# Patient Record
Sex: FEMALE | Race: WHITE | NOT HISPANIC OR LATINO | URBAN - METROPOLITAN AREA
[De-identification: names, ages, dates, MRNs, and addresses within clinical notes are randomized per-mention and may not be internally consistent; named-entity substitution may affect disease eponyms.]

---

## 2019-12-29 ENCOUNTER — EMERGENCY (EMERGENCY)
Facility: HOSPITAL | Age: 59
LOS: 1 days | Discharge: HOME | End: 2019-12-29
Attending: EMERGENCY MEDICINE
Payer: COMMERCIAL

## 2019-12-29 VITALS
DIASTOLIC BLOOD PRESSURE: 93 MMHG | HEIGHT: 61 IN | HEART RATE: 93 BPM | WEIGHT: 136.03 LBS | OXYGEN SATURATION: 99 % | TEMPERATURE: 98 F | RESPIRATION RATE: 20 BRPM | SYSTOLIC BLOOD PRESSURE: 148 MMHG

## 2019-12-29 VITALS
RESPIRATION RATE: 18 BRPM | SYSTOLIC BLOOD PRESSURE: 138 MMHG | OXYGEN SATURATION: 98 % | HEART RATE: 72 BPM | DIASTOLIC BLOOD PRESSURE: 83 MMHG

## 2019-12-29 DIAGNOSIS — Z98.890 OTHER SPECIFIED POSTPROCEDURAL STATES: Chronic | ICD-10-CM

## 2019-12-29 PROCEDURE — 73700 CT LOWER EXTREMITY W/O DYE: CPT

## 2019-12-29 PROCEDURE — 73562 X-RAY EXAM OF KNEE 3: CPT

## 2019-12-29 PROCEDURE — 72192 CT PELVIS W/O DYE: CPT | Mod: 26

## 2019-12-29 PROCEDURE — 99284 EMERGENCY DEPT VISIT MOD MDM: CPT | Mod: 25

## 2019-12-29 PROCEDURE — 73700 CT LOWER EXTREMITY W/O DYE: CPT | Mod: 26,LT

## 2019-12-29 PROCEDURE — 76377 3D RENDER W/INTRP POSTPROCES: CPT

## 2019-12-29 PROCEDURE — 73562 X-RAY EXAM OF KNEE 3: CPT | Mod: 26,LT

## 2019-12-29 PROCEDURE — 72192 CT PELVIS W/O DYE: CPT

## 2019-12-29 PROCEDURE — 99284 EMERGENCY DEPT VISIT MOD MDM: CPT

## 2019-12-29 RX ORDER — ACETAMINOPHEN 500 MG
975 TABLET ORAL ONCE
Refills: 0 | Status: COMPLETED | OUTPATIENT
Start: 2019-12-29 | End: 2019-12-29

## 2019-12-29 RX ORDER — OXYCODONE HYDROCHLORIDE 5 MG/1
10 TABLET ORAL ONCE
Refills: 0 | Status: DISCONTINUED | OUTPATIENT
Start: 2019-12-29 | End: 2019-12-29

## 2019-12-29 RX ORDER — IBUPROFEN 200 MG
600 TABLET ORAL ONCE
Refills: 0 | Status: COMPLETED | OUTPATIENT
Start: 2019-12-29 | End: 2019-12-29

## 2019-12-29 RX ORDER — ONDANSETRON 8 MG/1
4 TABLET, FILM COATED ORAL ONCE
Refills: 0 | Status: COMPLETED | OUTPATIENT
Start: 2019-12-29 | End: 2019-12-29

## 2019-12-29 RX ORDER — LIDOCAINE 4 G/100G
2 CREAM TOPICAL ONCE
Refills: 0 | Status: COMPLETED | OUTPATIENT
Start: 2019-12-29 | End: 2019-12-29

## 2019-12-29 RX ADMIN — ONDANSETRON 4 MILLIGRAM(S): 8 TABLET, FILM COATED ORAL at 15:52

## 2019-12-29 RX ADMIN — Medication 600 MILLIGRAM(S): at 14:13

## 2019-12-29 RX ADMIN — LIDOCAINE 2 PATCH: 4 CREAM TOPICAL at 14:14

## 2019-12-29 RX ADMIN — OXYCODONE HYDROCHLORIDE 10 MILLIGRAM(S): 5 TABLET ORAL at 14:13

## 2019-12-29 RX ADMIN — Medication 975 MILLIGRAM(S): at 14:12

## 2019-12-29 NOTE — ED PROVIDER NOTE - NSFOLLOWUPINSTRUCTIONS_ED_ALL_ED_FT
Use crutches all the time. Do not put weight on left hip. Take ibuprofen 600 mg (3 tabs) every 8 hours with food as needed for pain. Take acetaminophen 500 mg (1 pill extra strength tylenol) every 6 hours as needed for pain. Follow-up with orthopedic surgeon for hip pain, consideration for replacement.

## 2019-12-29 NOTE — ED PROVIDER NOTE - NSFOLLOWUPCLINICS_GEN_ALL_ED_FT
[facility]  Orthopedics  .  [place]   Phone: [phone]  Fax:   Follow Up Time: 1-3 Days

## 2019-12-29 NOTE — ED PROVIDER NOTE - CLINICAL SUMMARY MEDICAL DECISION MAKING FREE TEXT BOX
Dr. Casas (Attending Physician)  Pt. with ho osteochondritis desiccans pw left hip pain and left low back pain radiating down her left leg worse with movement. DP pulses 2+, NV intact distally, TTP over left low back and left trochanter of hip.  Since unable to weight bare will CT hip since had xrays done previously at [place] 2 days ago.

## 2019-12-29 NOTE — ED PROVIDER NOTE - PATIENT PORTAL LINK FT
You can access the FollowMyHealth Patient Portal offered by [facility] by registering at the following website: [email or web address]. By joining [facility]’s FollowMyHealth portal, you will also be able to view your health information using other applications (apps) compatible with our system.

## 2019-12-29 NOTE — ED ADULT NURSE NOTE - OBJECTIVE STATEMENT
Pt presents for eval of left sided back pain, radiating down left leg to knee, for which she was seen @ [facility] and sent with Motrin, which she states did not help.  She cannot bear weight on left leg.  Denies direct trauma, stating congenital hip dysplasia as a hx.  No redness, swelling or other deformity noted.

## 2019-12-29 NOTE — ED PROVIDER NOTE - PROGRESS NOTE DETAILS
Dr. Casas (Attending Physician)  No fracture. Bilateral hip arthritis. Will give crutches and follow-up with ortho.

## 2019-12-29 NOTE — ED PROVIDER NOTE - OBJECTIVE STATEMENT
Dr. Casas (Attending Physician)  Pt. with ho osteochondrosis pw left low back pain x 4 days followed by left hip pain radiating down to left knee worse with movement, constant cramping. Denies numbness, tingling, weakness, no urinary or bowel incontinence, no perineal paresthesias. Unable to bare weight on left leg. Seen at [place] 2 days ago had hip xray, bilateral LE ultrasound.

## 2019-12-29 NOTE — ED PROVIDER NOTE - CARE PROVIDER_API CALL
Adonis Carballo)  Orthopedics  [address], [address]  [place] [address]  Phone: [phone]  Fax: [phone]  Follow Up Time: 1-3 Days

## 2019-12-30 ENCOUNTER — INBOUND DOCUMENT (OUTPATIENT)
Age: 59
End: 2019-12-30

## 2019-12-31 PROBLEM — M48.00 SPINAL STENOSIS, SITE UNSPECIFIED: Chronic | Status: ACTIVE | Noted: 2019-12-29

## 2019-12-31 PROBLEM — F07.81 POSTCONCUSSIONAL SYNDROME: Chronic | Status: ACTIVE | Noted: 2019-12-29

## 2019-12-31 PROBLEM — Q65.89 OTHER SPECIFIED CONGENITAL DEFORMITIES OF HIP: Chronic | Status: ACTIVE | Noted: 2019-12-29

## 2020-01-07 ENCOUNTER — APPOINTMENT (OUTPATIENT)
Dept: ORTHOPEDIC SURGERY | Facility: CLINIC | Age: 60
End: 2020-01-07
Payer: COMMERCIAL

## 2020-01-07 VITALS
HEART RATE: 78 BPM | WEIGHT: 135 LBS | BODY MASS INDEX: 25.49 KG/M2 | SYSTOLIC BLOOD PRESSURE: 158 MMHG | HEIGHT: 61 IN | DIASTOLIC BLOOD PRESSURE: 97 MMHG

## 2020-01-07 PROCEDURE — 73523 X-RAY EXAM HIPS BI 5/> VIEWS: CPT

## 2020-01-07 PROCEDURE — 99205 OFFICE O/P NEW HI 60 MIN: CPT

## 2020-01-07 NOTE — PHYSICAL EXAM
[de-identified] : Patient is well nourished, well-developed, in no acute distress, with appropriate mood and affect. The patient is oriented to time, place, and person. Respirations are even and unlabored. Gait evaluation does not reveal a limp. There is no inguinal adenopathy. \par The right limb is well-perfused and showed 2+ dp/pt pulses, without skin lesions, shows a grossly normal motor and sensory examination. Examination of the hip shows no skin lesions. Hip motion is full and painless from 0-90 degrees extension to flexion, 10 degrees adduction and 10 degrees abduction, and 0 degrees internal and 10 degrees external rotation. Stinchfield test is positive. FADIR test is positive. DEANGELO test is positive. The left limb is well-perfused and showed 2+ dp/pt pulses, without skin lesions, shows a grossly normal motor and sensory examination. Examination of the hip shows no skin lesions. Hip motion is full and painless from 0-90 degrees extension to flexion, 20 degrees adduction and 20 degrees abduction, and 15 degrees internal and 30 degrees external rotation. Stinchfield test is positive.  FADIR test is positive. DEANGELO test is positive. Leg lengths are approximately equal. Both hips are stable and muscle strength is normal with good strength with resisted abduction and adduction. Pedal pulses are palpable.  Tender to palpation about the sacroiliac joint.\par Examination of the lumbar spine reveals full range of motion without pain. There is no tenderness to palpation of the osseous structures or paravertebral soft tissues. There is no muscle spasm. Straight leg raise is negative bilaterally. [de-identified] : AP and lateral x-rays of the bilateral hip, pelvis, and femur were ordered and taken in the office and demonstrate moderate to severe degenerative joint disease of the hip with joint space narrowing, osteophyte formation, and subchondral sclerosis.\par

## 2020-01-07 NOTE — HISTORY OF PRESENT ILLNESS
[de-identified] : This is a 59-year-old female experiencing bilateral hip pain, which is severe in intensity.  She also has severe bilateral buttock pain and low back pain.  No bowel or bladder incontinence.  Her pain was severe that she went to the emergency department recently and received multiple different narcotics and NSAIDs which did not help to improve the pain.  She is pending a left SI joint injection and the left hip joint injection soon.  She is previously had bilateral hip injections which did not help to improve the pain.  The pain substantially limits activities of daily living. Walking tolerance is reduced. Medication and activity modification have been minimally effective for a period lasting greater than three months in duration. Assistive devices and external support were not deemed by the patient to be helpful in improving their function. Due to the severity of osteoarthritis and level of pain, physical therapy is contraindicated. Pain and restriction of function are intolerable at this time.  She is using crutches now.

## 2020-01-07 NOTE — DISCUSSION/SUMMARY
[de-identified] : This patient has bilateral hip osteoarthritis.  However she is coexisting sacroiliitis and lumbar spinal stenosis.  It is unclear the exact etiology and source of her extreme pain that she has given the severity of her pain symptoms.  As she is pending in 2 days sacroiliac joint injections I suggest that she do this first and keep a pain diary.  She is also referred to physiatry consult consider epidural steroid injections or selective nerve root injections to see if this will help improve her pain.  I would like her to follow-up with me in 2 to 3 months after receiving all the injections including another round of the hip joint injections to see what relieves the most pain.  If it is the hip joint that it relieves the most pain and she would be a good candidate for total hip arthroplasty at this time.

## 2020-04-21 ENCOUNTER — APPOINTMENT (OUTPATIENT)
Dept: ORTHOPEDIC SURGERY | Facility: CLINIC | Age: 60
End: 2020-04-21

## 2020-10-06 ENCOUNTER — APPOINTMENT (OUTPATIENT)
Dept: ORTHOPEDIC SURGERY | Facility: CLINIC | Age: 60
End: 2020-10-06
Payer: COMMERCIAL

## 2020-10-06 PROCEDURE — 73522 X-RAY EXAM HIPS BI 3-4 VIEWS: CPT

## 2020-10-06 PROCEDURE — 99215 OFFICE O/P EST HI 40 MIN: CPT

## 2020-10-06 NOTE — DISCUSSION/SUMMARY
[de-identified] : This patient has bilateral hip osteoarthritis.  She is failed a course of conservative management and would like to proceed with a direct anterior approach right total hip arthroplasty.  After allowing a minimum of 3 months for healing she would then like to proceed with a staged left total hip arthroplasty.\par \par The patient is an appropriate candidate for consideration of right total hip replacement. An extensive discussion was conducted of the natural history of the disease and the variety of surgical and non-surgical treatment options available to the patient. A risk/benefit analysis was discussed with the patient reviewing the advantages and disadvantages of surgical intervention at this time. Both the level and length of the patient's pain have made additional conservative treatment measures consisting of NSAIDs, physical therapy, and/or corticosteroids contraindicated. A full explanation was given of the nature and the purpose of the procedure and anesthesia, its benefits, possible alternative methods of diagnosis or treatment, the risks involved, the possibility of complications, the foreseeable consequences of the procedure and the possible results of the non-treatment. I reviewed the plan of care as well as used a model of a total hip implant equivalent to the one that will be used for their total hip joint replacement. The ability to secure the implant utilizing cement or cementless (press-fit) was discussed with the patient. The patient agrees with the plan of care, as well as the use of implants for their total hip replacement. \par \par No guarantee or assurance was made as to the results that may be obtained. Specifically, the risks were identified to include, but are not limited to the following: Infection, phlebitis, pulmonary embolism, death, paralysis, dislocation, pain, stiffness, instability, limp, weakness, breakage, leg-length inequality, uncontrolled bleeding, nerve injury, blood vessel injury, pressure sores, anesthetic risks, delayed healing of wound and bone, and wear and loosening. Further discussion was undertaken with the patient about the details of surgical preparation, treatment, and postoperative rehabilitation including medical clearance, autotransfusion, the hospital course, and the postoperative rehabilitation involved. All in all, I feel that this patient is a good candidate for surgical reconstruction.\par \par The patient and I discussed the current SARS-CoV-2 (COVID-19) pandemic which has affected our local hospitals. We discussed that our hospitals treat patients with COVID-19. All efforts will be made to avoid cohorting the patient with diagnosed or suspected COVID-19 patient. They also understand that we will screen them 24-48 hours prior to surgery. Despite our best efforts, there is a potential risk for iatrogenic transmission of COVID-19 to the patient during the perioperative period. Juan David COVID-19 during the perioperative period may increase the patient´s risks of an adverse outcome including postoperative pneumonia, difficulty breathing, requirement for a breathing tube (general endotracheal intubation), and death. The patient is understanding of this risk, and is willing to proceed with surgery at this time.\par \par I advised the patient that she is to avoid her elderly mother who has an open wound at home for least the first 1 to 2 weeks after surgery as this could increase her risk of infection after surgery.\par \par This patient understands that the implants that will be used may wear out or loosen over time resulting in failure of the device. Given the patient's relatively young age, this may require one or multiple revision operations in their lifetime. The patient understands this and is willing to proceed with surgery at this time. \par \par The patient also understands that the surgery will be performed in a staged manner.  We will do the right hip first which may cause the legs to be slightly unequal with the right leg being slightly longer than the left leg temporarily.  After allowing 3 months for healing then we will proceed with a left total hip arthroplasty at which point we will equalize her leg lengths.\par \par The patient and I discussed the option for outpatient joint replacement. They will not stay overnight in the hospital after surgery and will discharge home on the same day of surgery. The risks and benefits of this type of rapid recovery protocol was reviewed with the patient and they are in agreement with proceeding with outpatient joint replacement surgery. They understand that in the event of an emergency, that they will be transferred to the main hospital for inpatient care.

## 2020-10-06 NOTE — HISTORY OF PRESENT ILLNESS
[de-identified] : This is a 60-year-old female experiencing bilateral hip pain, which is severe in intensity.  She also has severe bilateral buttock pain and low back pain.  No bowel or bladder incontinence.  She has known right hip osteoarthritis and known left hip osteoarthritis.  She has been trialing SI joint injections which helped approximately 50% on the left side but did not help on the right side and helped approximately 50% with left hip trochanteric bursa injection on the left side but did not help on the right side.  Mainly the pain is in the legs in the groin and thigh that radiates down to the knee.  She is previously had bilateral hip injections which did not help to improve the pain.  The pain substantially limits activities of daily living. Walking tolerance is reduced. Medication and activity modification have been minimally effective for a period lasting greater than three months in duration. Assistive devices and external support were not deemed by the patient to be helpful in improving their function. Due to the severity of osteoarthritis and level of pain, physical therapy is contraindicated. Pain and restriction of function are intolerable at this time.  Ambulating without a cane today but has used a crutch in the past.

## 2020-10-06 NOTE — PHYSICAL EXAM
[de-identified] : Patient is well nourished, well-developed, in no acute distress, with appropriate mood and affect. The patient is oriented to time, place, and person. Respirations are even and unlabored. Gait evaluation reveals a limp. There is no inguinal adenopathy.  The right limb is well-perfused and showed 2+ dp/pt pulses, without skin lesions, shows a grossly normal motor and sensory examination. Examination of the hip shows no skin lesions. Hip motion is reduced and causes pain. FADIR is positive and DEANGELO is positive. Stinchfield test is positive. The left limb is well-perfused and showed 2+ dp/pt pulses, without skin lesions, shows a grossly normal motor and sensory examination. Examination of the hip shows no skin lesions. Hip motion is reduced and causes pain. FADIR is positive and DEANGELO is positive. Stinchfield test is positive.  Leg lengths are approximately equal. Both hips are stable and muscle strength is normal with good strength with resisted abduction and adduction. Pedal pulses are palpable. [de-identified] : AP and lateral x-rays of the bilateral hip, pelvis, and femur were ordered and taken in the office and demonstrate severe degenerative joint disease of the hip with joint space narrowing, osteophyte formation, and subchondral sclerosis.\par

## 2020-10-29 ENCOUNTER — OUTPATIENT (OUTPATIENT)
Dept: OUTPATIENT SERVICES | Facility: HOSPITAL | Age: 60
LOS: 1 days | Discharge: HOME | End: 2020-10-29
Payer: COMMERCIAL

## 2020-10-29 VITALS
RESPIRATION RATE: 16 BRPM | TEMPERATURE: 99 F | OXYGEN SATURATION: 98 % | DIASTOLIC BLOOD PRESSURE: 77 MMHG | HEIGHT: 61 IN | SYSTOLIC BLOOD PRESSURE: 110 MMHG | WEIGHT: 139.55 LBS | HEART RATE: 83 BPM

## 2020-10-29 DIAGNOSIS — M16.11 UNILATERAL PRIMARY OSTEOARTHRITIS, RIGHT HIP: ICD-10-CM

## 2020-10-29 DIAGNOSIS — Z98.890 OTHER SPECIFIED POSTPROCEDURAL STATES: Chronic | ICD-10-CM

## 2020-10-29 DIAGNOSIS — Z01.818 ENCOUNTER FOR OTHER PREPROCEDURAL EXAMINATION: ICD-10-CM

## 2020-10-29 DIAGNOSIS — E03.9 HYPOTHYROIDISM, UNSPECIFIED: ICD-10-CM

## 2020-10-29 DIAGNOSIS — E78.5 HYPERLIPIDEMIA, UNSPECIFIED: ICD-10-CM

## 2020-10-29 LAB
ANION GAP SERPL CALC-SCNC: 5 MMOL/L — SIGNIFICANT CHANGE UP (ref 5–17)
APTT BLD: 34 SEC — SIGNIFICANT CHANGE UP (ref 27.5–35.5)
BLD GP AB SCN SERPL QL: SIGNIFICANT CHANGE UP
BUN SERPL-MCNC: 12 MG/DL — SIGNIFICANT CHANGE UP (ref 7–23)
CALCIUM SERPL-MCNC: 8.9 MG/DL — SIGNIFICANT CHANGE UP (ref 8.5–10.1)
CHLORIDE SERPL-SCNC: 107 MMOL/L — SIGNIFICANT CHANGE UP (ref 96–108)
CO2 SERPL-SCNC: 29 MMOL/L — SIGNIFICANT CHANGE UP (ref 22–31)
CREAT SERPL-MCNC: 0.65 MG/DL — SIGNIFICANT CHANGE UP (ref 0.5–1.3)
GLUCOSE SERPL-MCNC: 82 MG/DL — SIGNIFICANT CHANGE UP (ref 70–99)
HCT VFR BLD CALC: 40.3 % — SIGNIFICANT CHANGE UP (ref 34.5–45)
HGB BLD-MCNC: 13 G/DL — SIGNIFICANT CHANGE UP (ref 11.5–15.5)
INR BLD: 1.05 RATIO — SIGNIFICANT CHANGE UP (ref 0.88–1.16)
MCHC RBC-ENTMCNC: 29 PG — SIGNIFICANT CHANGE UP (ref 27–34)
MCHC RBC-ENTMCNC: 32.3 GM/DL — SIGNIFICANT CHANGE UP (ref 32–36)
MCV RBC AUTO: 90 FL — SIGNIFICANT CHANGE UP (ref 80–100)
MRSA PCR RESULT.: SIGNIFICANT CHANGE UP
NRBC # BLD: 0 /100 WBCS — SIGNIFICANT CHANGE UP (ref 0–0)
PLATELET # BLD AUTO: 284 K/UL — SIGNIFICANT CHANGE UP (ref 150–400)
POTASSIUM SERPL-MCNC: 3.9 MMOL/L — SIGNIFICANT CHANGE UP (ref 3.5–5.3)
POTASSIUM SERPL-SCNC: 3.9 MMOL/L — SIGNIFICANT CHANGE UP (ref 3.5–5.3)
PROTHROM AB SERPL-ACNC: 12.2 SEC — SIGNIFICANT CHANGE UP (ref 10.6–13.6)
RBC # BLD: 4.48 M/UL — SIGNIFICANT CHANGE UP (ref 3.8–5.2)
RBC # FLD: 12.8 % — SIGNIFICANT CHANGE UP (ref 10.3–14.5)
S AUREUS DNA NOSE QL NAA+PROBE: SIGNIFICANT CHANGE UP
SODIUM SERPL-SCNC: 141 MMOL/L — SIGNIFICANT CHANGE UP (ref 135–145)
WBC # BLD: 6.81 K/UL — SIGNIFICANT CHANGE UP (ref 3.8–10.5)
WBC # FLD AUTO: 6.81 K/UL — SIGNIFICANT CHANGE UP (ref 3.8–10.5)

## 2020-10-29 PROCEDURE — 93010 ELECTROCARDIOGRAM REPORT: CPT

## 2020-10-29 RX ORDER — ATORVASTATIN CALCIUM 80 MG/1
25 TABLET, FILM COATED ORAL
Qty: 0 | Refills: 0 | DISCHARGE

## 2020-10-29 RX ORDER — ATORVASTATIN CALCIUM 80 MG/1
0 TABLET, FILM COATED ORAL
Qty: 0 | Refills: 0 | DISCHARGE

## 2020-10-29 RX ORDER — LEVOTHYROXINE SODIUM 125 MCG
0 TABLET ORAL
Qty: 0 | Refills: 0 | DISCHARGE

## 2020-10-29 NOTE — OCCUPATIONAL THERAPY INITIAL EVALUATION ADULT - PERTINENT HX OF CURRENT PROBLEM, REHAB EVAL
R hip OA which impacts patients ability to perform functional tasks and transfers/mobility. Patient will have R MIGUEL anterior approach done on 11/13/20.

## 2020-10-29 NOTE — OCCUPATIONAL THERAPY INITIAL EVALUATION ADULT - ADDITIONAL COMMENTS
Patient lives with mother (who can not assist after. Patients brother can assist post op) in a private house with two steps with bilateral rails that are far apart. Once inside, the patients main bedroom and bathroom is on that level when entering. The patients bathroom has a tub/shower combination, fixed shower head, regular toilet and no grab bars. The patient ambulates with no device and does not own any device for ambulation. The patient daily pain is a 8/10 at rest and a 10/10 with movement. The patient manages the pain with rest. The patient has no recent outpatient PT, no recent falls and no buckling reported. Patient wears glasses all the time, R handed, drives and has no hearing impairments.

## 2020-10-29 NOTE — H&P PST ADULT - HISTORY OF PRESENT ILLNESS
60F pmh hypothyroid, hld c/o right hip pain 2/2 unilateral primary osteoarthritis here for PST for scheduled Right total hip arthroplasty direct anterior approach  This patient denies any fever, cough, sob, flu like symptoms or travel outside of the [place] in the past 30 days

## 2020-10-29 NOTE — H&P PST ADULT - NSICDXPROBLEM_GEN_ALL_CORE_FT
PROBLEM DIAGNOSES  Problem: Unilateral primary osteoarthritis, right hip  Assessment and Plan: Right total hip arthroplasty direct anterior approach  labs - cbc,pt/ptt,bmp,t&s,nose cx,ekg  M/C required  preop 3 day hibiclens instruction reviewed and given .instructed on if  nose cx positive use mupuricin 5 days and checklist given  take routine meds DOS with sips of water. avoid NSAID and OTC supplements. verbalized understanding  information on proper nutrition , increase protein and better food choices provided in packet   Ensure clear given    Problem: Hypothyroid  Assessment and Plan: Continue current regimen and medications.     Problem: HLD (hyperlipidemia)  Assessment and Plan: Continue current regimen and medications.

## 2020-10-29 NOTE — PHYSICAL THERAPY INITIAL EVALUATION ADULT - MODIFIED CLINICAL TEST OF SENSORY INTEGRATION IN BALANCE TEST
30 second Sit to Stand Test: (reps: 5 adaptation: B hands used) Functional assessment of lower extremity strength and ability to maintain balance in standing, discriminates those with low and high levels of functional activity.

## 2020-10-29 NOTE — PHYSICAL THERAPY INITIAL EVALUATION ADULT - SINGLE LEG BALANCE TEST, REHAB EVAL
One Leg Stand Test (OLST): Right: 5 seconds. Left: 9 seconds. Functional test to assess fall risk. Both gait and stair negotiation require components of OLST. Participants unable to perform the OLST for at least 5 seconds are at increased risk for injurious fall.

## 2020-10-29 NOTE — PHYSICAL THERAPY INITIAL EVALUATION ADULT - ADDITIONAL COMMENTS
Pt lives in a private house with 2 steps to enter with B handrails that are far apart. Once inside pt can stay on the main level, there are no further steps to negotiate. Pt bathroom is a tub shower, no grab bars, fixed shower head, standard height toilet. Pt reports that a 3:1 commode can fit over toilet. Pt does not have/use dme. Pain is 8-9/10 at rest and with activities such as static positions like sitting. Pt has no relief at this time and does not take pain medications. Pt reports no adverse reactions to pain medications, no recent PT, no reported falls, no buckling experienced. Pt wears glasses, is R hand dominant, drives, no hearing impairments.

## 2020-10-29 NOTE — OCCUPATIONAL THERAPY INITIAL EVALUATION ADULT - RANGE OF MOTION EXAMINATION, UPPER EXTREMITY
bilateral UE Active ROM was WFL  (within functional limits)/bilateral UE Passive ROM was WFL  (within functional limits)/Grossly.

## 2020-10-29 NOTE — H&P PST ADULT - NSANTHOSAYNRD_GEN_A_CORE
No. ROBIN screening performed.  STOP BANG Legend: 0-2 = LOW Risk; 3-4 = INTERMEDIATE Risk; 5-8 = HIGH Risk

## 2020-10-29 NOTE — H&P PST ADULT - ASSESSMENT
60F pmh hypothyroid, hld c/o right hip pain 2/2 unilateral primary osteoarthritis here for PST for scheduled Right total hip arthroplasty direct anterior approach  CAPRINI SCORE    AGE RELATED RISK FACTORS                                                       MOBILITY RELATED FACTORS  [x ] Age 41-60 years                                            (1 Point)                  [ ] Bed rest                                                        (1 Point)  [ ] Age: 61-74 years                                           (2 Points)                [ ] Plaster cast                                                   (2 Points)  [ ] Age= 75 years                                              (3 Points)                 [ ] Bed bound for more than 72 hours                   (2 Points)    DISEASE RELATED RISK FACTORS                                               GENDER SPECIFIC FACTORS  [ ] Edema in the lower extremities                       (1 Point)                  [ ] Pregnancy                                                     (1 Point)  [ ] Varicose veins                                               (1 Point)                  [ ] Post-partum < 6 weeks                                   (1 Point)             [x ] BMI > 25 Kg/m2                                            (1 Point)                  [ ] Hormonal therapy  or oral contraception            (1 Point)                 [ ] Sepsis (in the previous month)                        (1 Point)                  [ ] History of pregnancy complications  [ ] Pneumonia or serious lung disease                                               [ ] Unexplained or recurrent                       (1 Point)           (in the previous month)                               (1 Point)  [ ] Abnormal pulmonary function test                     (1 Point)                 SURGERY RELATED RISK FACTORS  [ ] Acute myocardial infarction                              (1 Point)                 [ ]  Section                                            (1 Point)  [ ] Congestive heart failure (in the previous month)  (1 Point)                 [ ] Minor surgery                                                 (1 Point)   [ ] Inflammatory bowel disease                             (1 Point)                 [ ] Arthroscopic surgery                                        (2 Points)  [ ] Central venous access                                    (2 Points)                [ ] General surgery lasting more than 45 minutes   (2 Points)       [ ] Stroke (in the previous month)                          (5 Points)               [x ] Elective arthroplasty                                        (5 Points)                                                                                                                                               HEMATOLOGY RELATED FACTORS                                                 TRAUMA RELATED RISK FACTORS  [ ] Prior episodes of VTE                                     (3 Points)                 [ ] Fracture of the hip, pelvis, or leg                       (5 Points)  [ ] Positive family history for VTE                         (3 Points)                 [ ] Acute spinal cord injury (in the previous month)  (5 Points)  [ ] Prothrombin 97117 A                                      (3 Points)                 [ ] Paralysis  (less than 1 month)                          (5 Points)  [ ] Factor V Leiden                                             (3 Points)                 [ ] Multiple Trauma within 1 month                         (5 Points)  [ ] Lupus anticoagulants                                     (3 Points)                                                           [ ] Anticardiolipin antibodies                                (3 Points)                                                       [ ] High homocysteine in the blood                      (3 Points)                                             [ ] Other congenital or acquired thrombophilia       (3 Points)                                                [ ] Heparin induced thrombocytopenia                  (3 Points)                                          Total Score [    7      ]

## 2020-10-29 NOTE — H&P PST ADULT - NSICDXPASTMEDICALHX_GEN_ALL_CORE_FT
PAST MEDICAL HISTORY:  Hip dysplasia, congenital     HLD (hyperlipidemia)     Post concussion syndrome     Spinal stenosis      PAST MEDICAL HISTORY:  Herniated intervertebral disc of lumbar spine     Hip dysplasia, congenital     HLD (hyperlipidemia)     Post concussion syndrome     Spinal stenosis

## 2020-10-30 LAB
A1C WITH ESTIMATED AVERAGE GLUCOSE RESULT: 5.8 % — HIGH (ref 4–5.6)
ESTIMATED AVERAGE GLUCOSE: 120 MG/DL — HIGH (ref 68–114)
HCV AB S/CO SERPL IA: 0.11 S/CO — SIGNIFICANT CHANGE UP (ref 0–0.99)
HCV AB SERPL-IMP: SIGNIFICANT CHANGE UP

## 2020-10-30 RX ORDER — ATORVASTATIN CALCIUM 80 MG/1
1 TABLET, FILM COATED ORAL
Qty: 0 | Refills: 0 | DISCHARGE

## 2020-11-10 ENCOUNTER — APPOINTMENT (OUTPATIENT)
Dept: DISASTER EMERGENCY | Facility: CLINIC | Age: 60
End: 2020-11-10

## 2020-11-13 ENCOUNTER — APPOINTMENT (OUTPATIENT)
Dept: ORTHOPEDIC SURGERY | Facility: HOSPITAL | Age: 60
End: 2020-11-13

## 2021-02-11 ENCOUNTER — FORM ENCOUNTER (OUTPATIENT)
Age: 61
End: 2021-02-11

## 2021-04-15 NOTE — ED ADULT NURSE NOTE - PMH
Hip dysplasia, congenital    Post concussion syndrome    Spinal stenosis 77 year old female presents today c/o left sided back pain radiating into the left side since waking up this morning, pt states that she was attempting to get out of bed to use the bathroom when she experienced sever pain described as an ache felt inside associated with numbness of the left leg, pt reports difficulty ambulating due to the pain worsening when she bears weight, pt denies recent trauma but does report having physical therapy in 2007 due to problems in her lower back (-) urinary or bowel incontinence, fevers or chills, flu like symptoms, chest pain or sob, she did call her PMD this morning and told to go tot  ER for an evaluation

## 2021-04-21 NOTE — H&P PST ADULT - BREASTS
[Good] : ~his/her~  mood as  good [No] : No [0] : 2) Feeling down, depressed, or hopeless: Not at all (0) [Fully functional (bathing, dressing, toileting, transferring, walking, feeding)] : Fully functional (bathing, dressing, toileting, transferring, walking, feeding) [Fully functional (using the telephone, shopping, preparing meals, housekeeping, doing laundry, using] : Fully functional and needs no help or supervision to perform IADLs (using the telephone, shopping, preparing meals, housekeeping, doing laundry, using transportation, managing medications and managing finances) [] : No [[id number]] : 0 [Change in mental status noted] : No change in mental status noted [Reports changes in hearing] : Reports no changes in hearing [Reports changes in vision] : Reports no changes in vision not examined

## 2021-05-25 PROBLEM — M51.26 OTHER INTERVERTEBRAL DISC DISPLACEMENT, LUMBAR REGION: Chronic | Status: ACTIVE | Noted: 2020-10-29

## 2021-05-25 PROBLEM — E78.5 HYPERLIPIDEMIA, UNSPECIFIED: Chronic | Status: ACTIVE | Noted: 2020-10-29

## 2021-06-04 ENCOUNTER — APPOINTMENT (OUTPATIENT)
Dept: VASCULAR SURGERY | Facility: CLINIC | Age: 61
End: 2021-06-04
Payer: COMMERCIAL

## 2021-06-04 VITALS
HEART RATE: 69 BPM | HEIGHT: 61 IN | BODY MASS INDEX: 26.43 KG/M2 | TEMPERATURE: 96.4 F | DIASTOLIC BLOOD PRESSURE: 88 MMHG | WEIGHT: 140 LBS | SYSTOLIC BLOOD PRESSURE: 132 MMHG

## 2021-06-04 DIAGNOSIS — I78.1 NEVUS, NON-NEOPLASTIC: ICD-10-CM

## 2021-06-04 PROCEDURE — 99072 ADDL SUPL MATRL&STAF TM PHE: CPT

## 2021-06-04 PROCEDURE — 93970 EXTREMITY STUDY: CPT

## 2021-06-04 PROCEDURE — 99204 OFFICE O/P NEW MOD 45 MIN: CPT

## 2021-06-04 NOTE — PHYSICAL EXAM
[JVD] : no jugular venous distention  [Normal Breath Sounds] : Normal breath sounds [Normal Heart Sounds] : normal heart sounds [de-identified] : awake, alert [FreeTextEntry1] : trace ankle edema\par palp pedal pulses\par mult telangiectasias, bilat thighs\par no wounds or ulcers

## 2021-06-04 NOTE — ASSESSMENT
[FreeTextEntry1] : bilat pain is non vascular in etiology.  \par I assured her that the spider veins were not the cause of her sx, and that sclerotherapy would not alleviate any symptoms.  \par I stated this most likely represented worsening of her existing hip/back issues, and advised her to fu w her orthopedic surgeon\par She also mentioned prior hx of elevated inflammatory markers, I rec her to discuss w rheumatology.\par \par fu prn

## 2021-06-04 NOTE — HISTORY OF PRESENT ILLNESS
[FreeTextEntry1] : 61F, bilat leg pain.  Pt has chonic lower ext pain, but has noticed worsening of her sx in the last 2 months.  Over same time periodshe has noticed new spider veins and swelling of the ankles.  No hx dvt, no fam hx hypercoaguability\par no trauma, no prior lower ext surgeries\par no smoking.\par \par She does have a sig hx severe bilat hip arthritis, as well as spinal stenosis.  she had planned hip surgery but had to cancel due to family reasons.

## 2021-06-24 ENCOUNTER — APPOINTMENT (OUTPATIENT)
Dept: SURGERY | Facility: CLINIC | Age: 61
End: 2021-06-24
Payer: COMMERCIAL

## 2021-06-24 VITALS
HEART RATE: 80 BPM | BODY MASS INDEX: 27 KG/M2 | DIASTOLIC BLOOD PRESSURE: 79 MMHG | WEIGHT: 143 LBS | HEIGHT: 61 IN | SYSTOLIC BLOOD PRESSURE: 120 MMHG

## 2021-06-24 DIAGNOSIS — Z86.39 PERSONAL HISTORY OF OTHER ENDOCRINE, NUTRITIONAL AND METABOLIC DISEASE: ICD-10-CM

## 2021-06-24 DIAGNOSIS — Z87.39 PERSONAL HISTORY OF OTHER DISEASES OF THE MUSCULOSKELETAL SYSTEM AND CONNECTIVE TISSUE: ICD-10-CM

## 2021-06-24 DIAGNOSIS — I87.2 VENOUS INSUFFICIENCY (CHRONIC) (PERIPHERAL): ICD-10-CM

## 2021-06-24 PROCEDURE — 99204 OFFICE O/P NEW MOD 45 MIN: CPT

## 2021-06-24 PROCEDURE — 99072 ADDL SUPL MATRL&STAF TM PHE: CPT

## 2021-06-24 RX ORDER — AMOXICILLIN AND CLAVULANATE POTASSIUM 875; 125 MG/1; MG/1
875-125 TABLET, COATED ORAL
Qty: 20 | Refills: 0 | Status: COMPLETED | COMMUNITY
Start: 2020-08-01 | End: 2021-06-24

## 2021-06-25 NOTE — REASON FOR VISIT
[Initial Consultation] : an initial consultation for [FreeTextEntry2] : Left postauricular lymph nodes

## 2021-06-25 NOTE — CONSULT LETTER
[Dear  ___] : Dear  [unfilled], [Consult Letter:] : I had the pleasure of evaluating your patient, [unfilled]. [Please see my note below.] : Please see my note below. [Consult Closing:] : Thank you very much for allowing me to participate in the care of this patient.  If you have any questions, please do not hesitate to contact me. [Sincerely,] : Sincerely, [FreeTextEntry2] : Dr. Jim Cross, Dr. Dougie Slater [FreeTextEntry3] : Grayson Baptiste MD, FACS\par System Director, Endocrine Surgery\par [facility]\par Assistant Clinical Professor of Surgery\par [facility] [employer] [facility]  [DrTrinity  ___] : Dr. STRATTON

## 2021-06-25 NOTE — PHYSICAL EXAM
[de-identified] : no palpable thyroid nodules [Laryngoscopy Performed] : laryngoscopy was performed, see procedure section for findings [Midline] : located in midline position [Normal] : orientation to person, place, and time: normal [de-identified] : subcentimeter left postauricular node overlying mastoid, well circumscribed and flat.  [de-identified] : indirect  laryngoscopy shows normal vocal cord mobility bilaterally with no lesions noted

## 2021-06-25 NOTE — ASSESSMENT
[FreeTextEntry1] : sonogram requested. to call next week for results. patient has been given the opportunity to ask questions, and all of the patient's questions have been answered to their satisfaction\par

## 2021-06-25 NOTE — HISTORY OF PRESENT ILLNESS
[de-identified] : Pt c/o left postauricular lymph nodes for several  months.   also has nodes in bilateral groin and abdomen.  denies night sweats,  dental or scalp infection, fever, skin cancer excision, dysphagia, hoarseness. I have reviewed all old and new data and available images.\par

## 2021-07-14 ENCOUNTER — NON-APPOINTMENT (OUTPATIENT)
Age: 61
End: 2021-07-14

## 2021-07-14 DIAGNOSIS — R59.0 LOCALIZED ENLARGED LYMPH NODES: ICD-10-CM

## 2021-10-01 NOTE — ED ADULT NURSE NOTE - NSFALLRSKASSESASSIST_ED_ALL_ED
I was present during the key portion of the procedure.
I was present during the key portion of the procedure.
yes

## 2021-10-08 NOTE — ED PROVIDER NOTE - CARE PROVIDERS DIRECT ADDRESSES
Forwarded to Dr. Santillan for review.          [email or web address]

## 2021-11-09 ENCOUNTER — APPOINTMENT (OUTPATIENT)
Dept: SURGERY | Facility: CLINIC | Age: 61
End: 2021-11-09

## 2022-01-01 ENCOUNTER — EMERGENCY (EMERGENCY)
Facility: HOSPITAL | Age: 62
LOS: 1 days | Discharge: HOME | End: 2022-01-01
Attending: STUDENT IN AN ORGANIZED HEALTH CARE EDUCATION/TRAINING PROGRAM | Admitting: EMERGENCY MEDICINE
Payer: MEDICARE

## 2022-01-01 VITALS
RESPIRATION RATE: 15 BRPM | HEART RATE: 70 BPM | TEMPERATURE: 98 F | SYSTOLIC BLOOD PRESSURE: 126 MMHG | DIASTOLIC BLOOD PRESSURE: 79 MMHG | OXYGEN SATURATION: 98 %

## 2022-01-01 VITALS
SYSTOLIC BLOOD PRESSURE: 149 MMHG | RESPIRATION RATE: 17 BRPM | HEIGHT: 61 IN | WEIGHT: 141.98 LBS | DIASTOLIC BLOOD PRESSURE: 88 MMHG | TEMPERATURE: 98 F | HEART RATE: 79 BPM | OXYGEN SATURATION: 96 %

## 2022-01-01 DIAGNOSIS — Z98.890 OTHER SPECIFIED POSTPROCEDURAL STATES: Chronic | ICD-10-CM

## 2022-01-01 PROCEDURE — 73700 CT LOWER EXTREMITY W/O DYE: CPT | Mod: MA

## 2022-01-01 PROCEDURE — 99284 EMERGENCY DEPT VISIT MOD MDM: CPT | Mod: 25

## 2022-01-01 PROCEDURE — 96374 THER/PROPH/DIAG INJ IV PUSH: CPT

## 2022-01-01 PROCEDURE — 73700 CT LOWER EXTREMITY W/O DYE: CPT | Mod: 26,50,MA

## 2022-01-01 PROCEDURE — 99284 EMERGENCY DEPT VISIT MOD MDM: CPT

## 2022-01-01 RX ORDER — KETOROLAC TROMETHAMINE 30 MG/ML
15 SYRINGE (ML) INJECTION ONCE
Refills: 0 | Status: DISCONTINUED | OUTPATIENT
Start: 2022-01-01 | End: 2022-01-01

## 2022-01-01 RX ADMIN — Medication 15 MILLIGRAM(S): at 16:48

## 2022-01-01 NOTE — ED PROVIDER NOTE - CLINICAL SUMMARY MEDICAL DECISION MAKING FREE TEXT BOX
62 yo F hx of congenital hip displasia, chronic b/l hip and SI joint pain, was scheduled for b/l hip replacement prior to COVID, pw severe b/l hip pain causing inability to ambulate. No red flag symptoms, + b/l ttp hip with  ROM due to pain. No other significant finding on exam. ddx: exacerbation of chronic hip pain, lower suspicion for occult fracture, cord compression. CT hip, analgesia. Anticipate discharge.

## 2022-01-01 NOTE — ED PROVIDER NOTE - OBJECTIVE STATEMENT
60 yo F hx of congenital hip displasia, chronic b/l hip and SI joint pain, was scheduled for b/l hip replacement prior to COVID, pw severe b/l hip pain causing inability to ambulate. Pt sees outpt pain management and orthopedics, gets hip injections, has ortho appt next week. Pt denies recent trauma, bowel or bladder incontinence/ retention, no LE weakness or numbness. Pt not compliant with outp pain regimen as Rx by pain management and PMD (is supposed to take Robaxin TID, only takes QD, sometimes BID). Pt states pain not relieved with 2g daily tylenol, is requesting toradol.

## 2022-01-01 NOTE — ED PROVIDER NOTE - NS ED ROS FT
GENERAL: No fever, chills  EYES: no vision changes, no discharge.   ENT: no difficulty swallowing or speaking   CARDIAC: no chest pain/pressure, SOB, lower extremity swelling  PULMONARY: no cough, SOB  GI: no abdominal pain, n/v/d  : no dysuria, no hematuria  SKIN: no rashes, no ecchymosis  NEURO: no headache, lightheadedness  MSK: + b/l hip pain (chronic).

## 2022-01-01 NOTE — ED PROVIDER NOTE - NSFOLLOWUPINSTRUCTIONS_ED_ALL_ED_FT
** Your CT shows progression of your chronic hip injury.     ** Take over the counter Tylenol or Ibuprofen for pain -- follow dosing directions on original bottle.    ** keep your appointment with your orthopedist next week.     ** Go to the nearest Emergency Department if you experience any new or concerning symptoms, such as:   - worsening pain  - chest pain  - difficulty breathing  - passing out  - unable to eat or drink  - unable to move or feel part of your body  - fever, chills

## 2022-01-01 NOTE — ED ADULT NURSE NOTE - NSICDXPASTMEDICALHX_GEN_ALL_CORE_FT
PAST MEDICAL HISTORY:  Herniated intervertebral disc of lumbar spine     Hip dysplasia, congenital     HLD (hyperlipidemia)     Post concussion syndrome     Spinal stenosis

## 2022-01-01 NOTE — ED PROVIDER NOTE - PROGRESS NOTE DETAILS
Lilli Vera M.D. [employer] Fellow  no acute fractures on CT. Pt offered admission given she cannot ambulate, pt declined. Pt declined opiate Rx. Pt educated on proper dosing of tylenol and ibuprofen for effective pain control.

## 2022-01-01 NOTE — ED ADULT NURSE NOTE - CHIEF COMPLAINT
The patient is a 61y Female complaining of hip pain/injury.
all other ROS negative except as per HPI

## 2022-01-01 NOTE — ED PROVIDER NOTE - PATIENT PORTAL LINK FT
You can access the FollowMyHealth Patient Portal offered by [facility] by registering at the following website: [email or web address]. By joining [email or web address]’s FollowMyHealth portal, you will also be able to view your health information using other applications (apps) compatible with our system.

## 2022-01-01 NOTE — ED PROVIDER NOTE - PHYSICAL EXAMINATION
GEN: Patient awake alert NAD.   HEENT: normocephalic, atraumatic, EOMI, no scleral icterus, moist MM  CARDIAC: RRR, S1, S2, no murmur.   PULM: CTA B/L no wheeze, rhonchi, rales.   ABD: soft NT, ND, no rebound no guarding  MSK: Moving all extremities, no edema + ttp b/l hip, rom limited by pain.      NEURO: A&Ox3, no focal neurological deficits, no saddle anesthesia, sensation of LE intact.    SKIN: warm, dry, no rash.

## 2022-01-14 DIAGNOSIS — Z00.00 ENCOUNTER FOR GENERAL ADULT MEDICAL EXAMINATION W/OUT ABNORMAL FINDINGS: ICD-10-CM

## 2022-01-18 ENCOUNTER — APPOINTMENT (OUTPATIENT)
Dept: ORTHOPEDIC SURGERY | Facility: CLINIC | Age: 62
End: 2022-01-18

## 2022-02-04 ENCOUNTER — APPOINTMENT (OUTPATIENT)
Dept: ORTHOPEDIC SURGERY | Facility: CLINIC | Age: 62
End: 2022-02-04
Payer: MEDICARE

## 2022-02-04 PROCEDURE — 73522 X-RAY EXAM HIPS BI 3-4 VIEWS: CPT

## 2022-02-04 PROCEDURE — 99214 OFFICE O/P EST MOD 30 MIN: CPT

## 2022-02-06 NOTE — HISTORY OF PRESENT ILLNESS
[de-identified] : This is a 61-year-old female experiencing bilateral hip pain, which is severe in intensity.  She has known right hip osteoarthritis and known left hip osteoarthritis.  The pain has been worsening.  I previously indicated her for total hip arthroplasty more than 1 year ago but she had to delay surgery for social reasons.  She has been trialing SI joint injections which helped approximately 50% on the left side but did not help on the right side and helped approximately 50% with left hip trochanteric bursa injection on the left side but did not help on the right side.  Mainly the pain is in the legs in the groin and thigh that radiates down to the knee.  She is previously had bilateral hip injections which did not help to improve the pain.  The pain substantially limits activities of daily living. Walking tolerance is reduced. Medication and activity modification have been minimally effective for a period lasting greater than three months in duration. Assistive devices and external support were not deemed by the patient to be helpful in improving their function. Due to the severity of osteoarthritis and level of pain, physical therapy is contraindicated. Pain and restriction of function are intolerable at this time.  Ambulating without a cane today but has used a crutch in the past.

## 2022-02-06 NOTE — PHYSICAL EXAM
[de-identified] : Patient is well nourished, well-developed, in no acute distress, with appropriate mood and affect. The patient is oriented to time, place, and person. Respirations are even and unlabored. Gait evaluation reveals a limp. There is no inguinal adenopathy.  The right limb is well-perfused and showed 2+ dp/pt pulses, without skin lesions, shows a grossly normal motor and sensory examination. Examination of the hip shows no skin lesions. Hip motion is reduced and causes pain. FADIR is positive and DEANGELO is positive. Stinchfield test is positive. The left limb is well-perfused and showed 2+ dp/pt pulses, without skin lesions, shows a grossly normal motor and sensory examination. Examination of the hip shows no skin lesions. Hip motion is reduced and causes pain. FADIR is positive and DEANGELO is positive. Stinchfield test is positive.  Leg lengths are approximately equal. Both hips are stable and muscle strength is normal with good strength with resisted abduction and adduction. Pedal pulses are palpable. [de-identified] : AP and lateral x-rays of the right hip, pelvis, and femur were ordered and taken in the office and demonstrate severe degenerative joint disease of the hip with joint space narrowing, osteophyte formation, and subchondral sclerosis.  AP radiograph of the left hip was ordered and obtained in the office which I reviewed and demonstrate severe left hip osteoarthritis.\par

## 2022-02-06 NOTE — DISCUSSION/SUMMARY
[de-identified] : This patient has bilateral hip osteoarthritis.  She is failed a course of conservative management and would like to proceed with a direct anterior approach right total hip arthroplasty.  After allowing a minimum of 3 months for healing she would then like to proceed with a staged left total hip arthroplasty.\par \par The patient is an appropriate candidate for consideration of right total hip replacement. An extensive discussion was conducted of the natural history of the disease and the variety of surgical and non-surgical treatment options available to the patient. A risk/benefit analysis was discussed with the patient reviewing the advantages and disadvantages of surgical intervention at this time. Both the level and length of the patient's pain have made additional conservative treatment measures consisting of NSAIDs, physical therapy, and/or corticosteroids contraindicated. A full explanation was given of the nature and the purpose of the procedure and anesthesia, its benefits, possible alternative methods of diagnosis or treatment, the risks involved, the possibility of complications, the foreseeable consequences of the procedure and the possible results of the non-treatment. I reviewed the plan of care as well as used a model of a total hip implant equivalent to the one that will be used for their total hip joint replacement. The ability to secure the implant utilizing cement or cementless (press-fit) was discussed with the patient. The patient agrees with the plan of care, as well as the use of implants for their total hip replacement. \par \par No guarantee or assurance was made as to the results that may be obtained. Specifically, the risks were identified to include, but are not limited to the following: Infection, phlebitis, pulmonary embolism, death, paralysis, dislocation, pain, stiffness, instability, limp, weakness, breakage, leg-length inequality, uncontrolled bleeding, nerve injury, blood vessel injury, pressure sores, anesthetic risks, delayed healing of wound and bone, and wear and loosening. Further discussion was undertaken with the patient about the details of surgical preparation, treatment, and postoperative rehabilitation including medical clearance, autotransfusion, the hospital course, and the postoperative rehabilitation involved. All in all, I feel that this patient is a good candidate for surgical reconstruction.\par \par The patient and I discussed the current SARS-CoV-2 (COVID-19) pandemic which has affected our local hospitals. We discussed that our hospitals treat patients with COVID-19. All efforts will be made to avoid cohorting the patient with diagnosed or suspected COVID-19 patient. They also understand that we will screen them 24-48 hours prior to surgery. Despite our best efforts, there is a potential risk for iatrogenic transmission of COVID-19 to the patient during the perioperative period. Juan David COVID-19 during the perioperative period may increase the patient´s risks of an adverse outcome including postoperative pneumonia, difficulty breathing, requirement for a breathing tube (general endotracheal intubation), and death. The patient is understanding of this risk, and is willing to proceed with surgery at this time.\par \par This patient understands that the implants that will be used may wear out or loosen over time resulting in failure of the device. Given the patient's relatively young age, this may require one or multiple revision operations in their lifetime. The patient understands this and is willing to proceed with surgery at this time. \par \par The patient also understands that the surgery will be performed in a staged manner.  We will do the right hip first which may cause the legs to be slightly unequal with the right leg being slightly longer than the left leg temporarily.  After allowing 3 months for healing then we will proceed with a left total hip arthroplasty at which point we will equalize her leg lengths.\par \par The patient and I discussed the option for outpatient joint replacement. They will not stay overnight in the hospital after surgery and will discharge home on the same day of surgery. The risks and benefits of this type of rapid recovery protocol was reviewed with the patient and they are in agreement with proceeding with outpatient joint replacement surgery. They understand that in the event of an emergency, that they will be transferred to the main hospital for inpatient care.

## 2022-04-28 ENCOUNTER — OUTPATIENT (OUTPATIENT)
Dept: OUTPATIENT SERVICES | Facility: HOSPITAL | Age: 62
LOS: 1 days | Discharge: HOME | End: 2022-04-28
Payer: MEDICARE

## 2022-04-28 VITALS
OXYGEN SATURATION: 97 % | HEIGHT: 61 IN | DIASTOLIC BLOOD PRESSURE: 89 MMHG | SYSTOLIC BLOOD PRESSURE: 144 MMHG | HEART RATE: 76 BPM | RESPIRATION RATE: 17 BRPM | WEIGHT: 150.13 LBS | TEMPERATURE: 98 F

## 2022-04-28 DIAGNOSIS — E78.5 HYPERLIPIDEMIA, UNSPECIFIED: ICD-10-CM

## 2022-04-28 DIAGNOSIS — Z01.818 ENCOUNTER FOR OTHER PREPROCEDURAL EXAMINATION: ICD-10-CM

## 2022-04-28 DIAGNOSIS — Z98.890 OTHER SPECIFIED POSTPROCEDURAL STATES: Chronic | ICD-10-CM

## 2022-04-28 DIAGNOSIS — M16.11 UNILATERAL PRIMARY OSTEOARTHRITIS, RIGHT HIP: ICD-10-CM

## 2022-04-28 DIAGNOSIS — E03.9 HYPOTHYROIDISM, UNSPECIFIED: ICD-10-CM

## 2022-04-28 LAB
A1C WITH ESTIMATED AVERAGE GLUCOSE RESULT: 5.8 % — HIGH (ref 4–5.6)
ALBUMIN SERPL ELPH-MCNC: 3.9 G/DL — SIGNIFICANT CHANGE UP (ref 3.3–5)
ALP SERPL-CCNC: 93 U/L — SIGNIFICANT CHANGE UP (ref 40–120)
ALT FLD-CCNC: 28 U/L — SIGNIFICANT CHANGE UP (ref 12–78)
ANION GAP SERPL CALC-SCNC: 4 MMOL/L — LOW (ref 5–17)
APTT BLD: 35.9 SEC — HIGH (ref 27.5–35.5)
AST SERPL-CCNC: 22 U/L — SIGNIFICANT CHANGE UP (ref 15–37)
BASOPHILS # BLD AUTO: 0.04 K/UL — SIGNIFICANT CHANGE UP (ref 0–0.2)
BASOPHILS NFR BLD AUTO: 0.6 % — SIGNIFICANT CHANGE UP (ref 0–2)
BILIRUB SERPL-MCNC: 0.6 MG/DL — SIGNIFICANT CHANGE UP (ref 0.2–1.2)
BLD GP AB SCN SERPL QL: SIGNIFICANT CHANGE UP
BUN SERPL-MCNC: 14 MG/DL — SIGNIFICANT CHANGE UP (ref 7–23)
CALCIUM SERPL-MCNC: 9.1 MG/DL — SIGNIFICANT CHANGE UP (ref 8.5–10.1)
CHLORIDE SERPL-SCNC: 104 MMOL/L — SIGNIFICANT CHANGE UP (ref 96–108)
CO2 SERPL-SCNC: 32 MMOL/L — HIGH (ref 22–31)
CREAT SERPL-MCNC: 0.66 MG/DL — SIGNIFICANT CHANGE UP (ref 0.5–1.3)
EGFR: 100 ML/MIN/1.73M2 — SIGNIFICANT CHANGE UP
EOSINOPHIL # BLD AUTO: 0.08 K/UL — SIGNIFICANT CHANGE UP (ref 0–0.5)
EOSINOPHIL NFR BLD AUTO: 1.1 % — SIGNIFICANT CHANGE UP (ref 0–6)
ESTIMATED AVERAGE GLUCOSE: 120 MG/DL — HIGH (ref 68–114)
GLUCOSE SERPL-MCNC: 86 MG/DL — SIGNIFICANT CHANGE UP (ref 70–99)
HCT VFR BLD CALC: 39.4 % — SIGNIFICANT CHANGE UP (ref 34.5–45)
HGB BLD-MCNC: 12.6 G/DL — SIGNIFICANT CHANGE UP (ref 11.5–15.5)
IMM GRANULOCYTES NFR BLD AUTO: 0.3 % — SIGNIFICANT CHANGE UP (ref 0–1.5)
INR BLD: 0.93 RATIO — SIGNIFICANT CHANGE UP (ref 0.88–1.16)
LYMPHOCYTES # BLD AUTO: 2.27 K/UL — SIGNIFICANT CHANGE UP (ref 1–3.3)
LYMPHOCYTES # BLD AUTO: 32 % — SIGNIFICANT CHANGE UP (ref 13–44)
MCHC RBC-ENTMCNC: 28.8 PG — SIGNIFICANT CHANGE UP (ref 27–34)
MCHC RBC-ENTMCNC: 32 G/DL — SIGNIFICANT CHANGE UP (ref 32–36)
MCV RBC AUTO: 90 FL — SIGNIFICANT CHANGE UP (ref 80–100)
MONOCYTES # BLD AUTO: 0.49 K/UL — SIGNIFICANT CHANGE UP (ref 0–0.9)
MONOCYTES NFR BLD AUTO: 6.9 % — SIGNIFICANT CHANGE UP (ref 2–14)
NEUTROPHILS # BLD AUTO: 4.2 K/UL — SIGNIFICANT CHANGE UP (ref 1.8–7.4)
NEUTROPHILS NFR BLD AUTO: 59.1 % — SIGNIFICANT CHANGE UP (ref 43–77)
NRBC # BLD: 0 /100 WBCS — SIGNIFICANT CHANGE UP (ref 0–0)
PLATELET # BLD AUTO: 267 K/UL — SIGNIFICANT CHANGE UP (ref 150–400)
POTASSIUM SERPL-MCNC: 3.6 MMOL/L — SIGNIFICANT CHANGE UP (ref 3.5–5.3)
POTASSIUM SERPL-SCNC: 3.6 MMOL/L — SIGNIFICANT CHANGE UP (ref 3.5–5.3)
PROT SERPL-MCNC: 8.1 GM/DL — SIGNIFICANT CHANGE UP (ref 6–8.3)
PROTHROM AB SERPL-ACNC: 11.1 SEC — SIGNIFICANT CHANGE UP (ref 10.5–13.4)
RBC # BLD: 4.38 M/UL — SIGNIFICANT CHANGE UP (ref 3.8–5.2)
RBC # FLD: 12.9 % — SIGNIFICANT CHANGE UP (ref 10.3–14.5)
SODIUM SERPL-SCNC: 140 MMOL/L — SIGNIFICANT CHANGE UP (ref 135–145)
WBC # BLD: 7.1 K/UL — SIGNIFICANT CHANGE UP (ref 3.8–10.5)
WBC # FLD AUTO: 7.1 K/UL — SIGNIFICANT CHANGE UP (ref 3.8–10.5)

## 2022-04-28 PROCEDURE — 93010 ELECTROCARDIOGRAM REPORT: CPT

## 2022-04-28 RX ORDER — METHOCARBAMOL 500 MG/1
2 TABLET, FILM COATED ORAL
Qty: 0 | Refills: 0 | DISCHARGE

## 2022-04-28 NOTE — PHYSICAL THERAPY INITIAL EVALUATION ADULT - GENERAL OBSERVATIONS, REHAB EVAL
Patient was seen seated  in chair in PT/OT preoperative assessment room with no apparent distress. Height:  5'1"     ; weight : 145    lbs.

## 2022-04-28 NOTE — H&P PST ADULT - NSICDXPASTSURGICALHX_GEN_ALL_CORE_FT
PAST SURGICAL HISTORY:  H/O sinus surgery      PAST SURGICAL HISTORY:  H/O sinus surgery 5/1997 and 5/1999

## 2022-04-28 NOTE — PHYSICAL THERAPY INITIAL EVALUATION ADULT - TINETTI BALANCE TEST, REHAB EVAL
Sitting Balance - 1/1 , Rises From Chair -1 /2, Attempts to rise -2 /2 , Immediate Standing Balance - 1/2,  Standing Balance - 2/2, Nudged - 2 /2, Eyes Closed -1 /1,  Turning 360 Deg -1 /2, Sitting down - 2/2, Balance Score - 13/16

## 2022-04-28 NOTE — PHYSICAL THERAPY INITIAL EVALUATION ADULT - TINETTI GAIT TEST, REHAB EVAL
Indication of gait -1/1,   Step Length and height -2/2,   Foot Clearance -2/2,   Step Symmetry - 0/1,  Step Continuity -1/1,   Path -2/ 2,   Trunk -1/2,   Walking Time -0/1,   Total Score - 9/12

## 2022-04-28 NOTE — OCCUPATIONAL THERAPY INITIAL EVALUATION ADULT - PERTINENT HX OF CURRENT PROBLEM, REHAB EVAL
R hip OA which impacts pts ability to perform functional tasks/transfers and mobility. Pt is scheduled for R THR anterior approach on 5/20/22.

## 2022-04-28 NOTE — PHYSICAL THERAPY INITIAL EVALUATION ADULT - PERTINENT HX OF CURRENT PROBLEM, REHAB EVAL
R hip OA c chronic pain and  limiting functional mobility. Pt is scheduled for R hip elective total joint replacement, anterior approach,  on 5/20/22  by  Dr. Carballo.

## 2022-04-28 NOTE — H&P PST ADULT - PROBLEM SELECTOR PLAN 2
labs - cbc,pt/ptt,bmp,t&s,nose cx,ekg  M/C required  preop 3 day Hibiclens instruction reviewed and given .instructed on if  nose cx positive use Mupirocin 5 days and checklist given  take routine meds DOS with sips of water. avoid NSAID and OTC supplements. verbalized understanding  information on proper nutrition , increase protein and better food choices provided in packet  anesthesiologist to review PST labs, EKG, medical clearance and optimization for surgery labs - cbc,pt/ptt,bmp,t&s,nose cx,ekg  Medical, cardiac, and dental clearance required  preop 3 day Hibiclens instruction reviewed and given .instructed on if  nose cx positive use Mupirocin 5 days and checklist given  take routine meds DOS with sips of water. avoid NSAID and OTC supplements. verbalized understanding  information on proper nutrition , increase protein and better food choices provided in packet  anesthesiologist to review PST labs, EKG, clearances and optimization for surgery

## 2022-04-28 NOTE — H&P PST ADULT - HISTORY OF PRESENT ILLNESS
62 year old female with a past medial history of hypothyroidism and HLD c/o pain and limited ROM to right hip secondary to osteoarthritis.  She is scheduled for a right hip arthoplasty direct anterior approach on 5/20/2022.    She denies fever, cough, SOB, recent travels, and sick contacts.    Goal: to walk without pain  62 year old female with a past medial history of hypothyroidism and HLD c/o pain and limited ROM to right hip secondary to osteoarthritis.  She is scheduled for a right hip arthoplasty direct anterior approach on 5/20/2022.    She denies fever, cough, SOB, recent travels, and sick contacts.    Goal: to walk without pain and ride exercise bike

## 2022-04-28 NOTE — PHYSICAL THERAPY INITIAL EVALUATION ADULT - ADDITIONAL COMMENTS
There are 2 steps, c far jose rails,  at the entry of the house and no steps to negotiate at home. Pt has a tub/shower combo c fixed shower head, no grab bar, and regular toilet seat  in BR. 3-1 commode will fit in BR. Average pain level is 10/10 at all times. Pt does not take any pain meds nor apply modalities for pain management. Pt has experience of adverse effects with Naproxen. Pt wears glasses for reading and distance and  no need for hearing aid. Pt is R handed and drives.

## 2022-04-28 NOTE — PHYSICAL THERAPY INITIAL EVALUATION ADULT - LIVES WITH, PROFILE
Mom in a private house. Brother will be available to assist pt in post -op care upon discharge home.

## 2022-04-28 NOTE — OCCUPATIONAL THERAPY INITIAL EVALUATION ADULT - ADDITIONAL COMMENTS
Pt lives with mother (Who is unable to assist. Pts brother will be around to assist post op) in a private house with 2 steps to enter with bilateral handrails that are far apart. Once inside, the pts main bedroom and bathroom is on that floor when entering. The pts bathroom has a tub/shower combination, fixed shower head, standard toilet seat and no grab bars. The pt reports that a 3/1 commode can fit over the toilet at home. The pt ambulates with no device and does not own any device for ambulation. The pt daily pain is a 9-10/10 with rest and movement. The pt manages the pain with rest and Tylenol. The pt has no recent outpatient PT, no recent falls and has no buckling of the legs. The pt wears glasses all the time, R handed, drives and has no hearing impairments.

## 2022-04-28 NOTE — H&P PST ADULT - ASSESSMENT
62 year old female with a past medial history of hypothyroidism and HLD c/o pain and limited ROM to right hip secondary to osteoarthritis.  She is scheduled for a right hip arthoplasty direct anterior approach on 2022    CAPRINI SCORE [CLOT]    AGE RELATED RISK FACTORS                                                       MOBILITY RELATED FACTORS  [ ] Age 41-60 years                                            (1 Point)                  [ ] Bed rest                                                        (1 Point)  [x ] Age: 61-74 years                                           (2 Points)                 [ ] Plaster cast                                                   (2 Points)  [ ] Age= 75 years                                              (3 Points)                 [ ] Bed bound for more than 72 hours                 (2 Points)    DISEASE RELATED RISK FACTORS                                               GENDER SPECIFIC FACTORS  [ ] Edema in the lower extremities                       (1 Point)                  [ ] Pregnancy                                                     (1 Point)  [ ] Varicose veins                                               (1 Point)                  [ ] Post-partum < 6 weeks                                   (1 Point)             [x ] BMI > 25 Kg/m2                                            (1 Point)                  [ ] Hormonal therapy  or oral contraception          (1 Point)                 [ ] Sepsis (in the previous month)                        (1 Point)                  [ ] History of pregnancy complications                 (1 point)  [ ] Pneumonia or serious lung disease                                               [ ] Unexplained or recurrent                     (1 Point)           (in the previous month)                               (1 Point)  [ ] Abnormal pulmonary function test                     (1 Point)                 SURGERY RELATED RISK FACTORS  [ ] Acute myocardial infarction                              (1 Point)                 [ ]  Section                                             (1 Point)  [ ] Congestive heart failure (in the previous month)  (1 Point)               [ ] Minor surgery                                                  (1 Point)   [ ] Inflammatory bowel disease                             (1 Point)                 [ ] Arthroscopic surgery                                        (2 Points)  [ ] Central venous access                                      (2 Points)                [ ] General surgery lasting more than 45 minutes   (2 Points)       [ ] Stroke (in the previous month)                          (5 Points)               x[ ] Elective arthroplasty                                         (5 Points)                                                                                                                                               HEMATOLOGY RELATED FACTORS                                                 TRAUMA RELATED RISK FACTORS  [ ] Prior episodes of VTE                                     (3 Points)                [ ] Fracture of the hip, pelvis, or leg                       (5 Points)  [ ] Positive family history for VTE                         (3 Points)                 [ ] Acute spinal cord injury (in the previous month)  (5 Points)  [ ] Prothrombin 89028 A                                     (3 Points)                 [ ] Paralysis  (less than 1 month)                             (5 Points)  [ ] Factor V Leiden                                             (3 Points)                  [ ] Multiple Trauma within 1 month                        (5 Points)  [ ] Lupus anticoagulants                                     (3 Points)                                                           [ ] Anticardiolipin antibodies                               (3 Points)                                                       [ ] High homocysteine in the blood                      (3 Points)                                             [ ] Other congenital or acquired thrombophilia      (3 Points)                                                [ ] Heparin induced thrombocytopenia                  (3 Points)                                          Total Score [      8    ]    Caprini Score 0 - 2:  Low Risk, No VTE Prophylaxis required for most patients, encourage ambulation  Caprini Score 3 - 6:  At Risk, pharmacologic VTE prophylaxis is indicated for most patients (in the absence of a contraindication)  Caprini Score Greater than or = 7:  High Risk, pharmacologic VTE prophylaxis is indicated for most patients (in the absence of a contraindication)    Caprini score indicates that the patient is high risk for VTE event ( score 6 or greater). Surgical patient's in this group will benefit from both pharmacologic prophylaxis and intermittent compression devices . Surgical team will determine the balance between VTE  risk and bleeding risk and other clinical considerations

## 2022-04-29 LAB
MRSA PCR RESULT.: SIGNIFICANT CHANGE UP
S AUREUS DNA NOSE QL NAA+PROBE: SIGNIFICANT CHANGE UP
VIT D25+D1,25 OH+D1,25 PNL SERPL-MCNC: 52.5 PG/ML — SIGNIFICANT CHANGE UP (ref 19.9–79.3)

## 2022-05-10 ENCOUNTER — RESULT REVIEW (OUTPATIENT)
Age: 62
End: 2022-05-10

## 2022-05-18 ENCOUNTER — OUTPATIENT (OUTPATIENT)
Dept: OUTPATIENT SERVICES | Facility: HOSPITAL | Age: 62
LOS: 1 days | Discharge: HOME | End: 2022-05-18

## 2022-05-18 DIAGNOSIS — U07.1 COVID-19: ICD-10-CM

## 2022-05-18 DIAGNOSIS — Z98.890 OTHER SPECIFIED POSTPROCEDURAL STATES: Chronic | ICD-10-CM

## 2022-05-19 ENCOUNTER — FORM ENCOUNTER (OUTPATIENT)
Age: 62
End: 2022-05-19

## 2022-05-19 RX ORDER — SENNA PLUS 8.6 MG/1
2 TABLET ORAL AT BEDTIME
Refills: 0 | Status: DISCONTINUED | OUTPATIENT
Start: 2022-05-20 | End: 2022-05-22

## 2022-05-19 RX ORDER — CELECOXIB 200 MG/1
200 CAPSULE ORAL EVERY 12 HOURS
Refills: 0 | Status: DISCONTINUED | OUTPATIENT
Start: 2022-05-21 | End: 2022-05-22

## 2022-05-19 RX ORDER — LANOLIN ALCOHOL/MO/W.PET/CERES
3 CREAM (GRAM) TOPICAL AT BEDTIME
Refills: 0 | Status: DISCONTINUED | OUTPATIENT
Start: 2022-05-20 | End: 2022-05-22

## 2022-05-19 RX ORDER — DEXAMETHASONE 0.5 MG/5ML
10 ELIXIR ORAL ONCE
Refills: 0 | Status: COMPLETED | OUTPATIENT
Start: 2022-05-21 | End: 2022-05-21

## 2022-05-19 RX ORDER — OXYCODONE HYDROCHLORIDE 5 MG/1
10 TABLET ORAL EVERY 4 HOURS
Refills: 0 | Status: DISCONTINUED | OUTPATIENT
Start: 2022-05-20 | End: 2022-05-22

## 2022-05-19 RX ORDER — BENZOCAINE AND MENTHOL 5; 1 G/100ML; G/100ML
1 LIQUID ORAL
Refills: 0 | Status: DISCONTINUED | OUTPATIENT
Start: 2022-05-20 | End: 2022-05-22

## 2022-05-19 RX ORDER — SODIUM CHLORIDE 9 MG/ML
500 INJECTION, SOLUTION INTRAVENOUS ONCE
Refills: 0 | Status: COMPLETED | OUTPATIENT
Start: 2022-05-20 | End: 2022-05-21

## 2022-05-19 RX ORDER — ASCORBIC ACID 60 MG
500 TABLET,CHEWABLE ORAL
Refills: 0 | Status: DISCONTINUED | OUTPATIENT
Start: 2022-05-20 | End: 2022-05-22

## 2022-05-19 RX ORDER — ONDANSETRON 8 MG/1
4 TABLET, FILM COATED ORAL EVERY 6 HOURS
Refills: 0 | Status: DISCONTINUED | OUTPATIENT
Start: 2022-05-20 | End: 2022-05-22

## 2022-05-19 RX ORDER — KETOROLAC TROMETHAMINE 30 MG/ML
15 SYRINGE (ML) INJECTION EVERY 6 HOURS
Refills: 0 | Status: DISCONTINUED | OUTPATIENT
Start: 2022-05-20 | End: 2022-05-21

## 2022-05-19 RX ORDER — ATORVASTATIN CALCIUM 80 MG/1
10 TABLET, FILM COATED ORAL AT BEDTIME
Refills: 0 | Status: DISCONTINUED | OUTPATIENT
Start: 2022-05-20 | End: 2022-05-22

## 2022-05-19 RX ORDER — OXYCODONE HYDROCHLORIDE 5 MG/1
5 TABLET ORAL EVERY 4 HOURS
Refills: 0 | Status: DISCONTINUED | OUTPATIENT
Start: 2022-05-20 | End: 2022-05-22

## 2022-05-19 RX ORDER — ACETAMINOPHEN 500 MG
1000 TABLET ORAL ONCE
Refills: 0 | Status: COMPLETED | OUTPATIENT
Start: 2022-05-20 | End: 2022-05-20

## 2022-05-19 RX ORDER — PANTOPRAZOLE SODIUM 20 MG/1
40 TABLET, DELAYED RELEASE ORAL
Refills: 0 | Status: DISCONTINUED | OUTPATIENT
Start: 2022-05-20 | End: 2022-05-22

## 2022-05-19 RX ORDER — POLYETHYLENE GLYCOL 3350 17 G/17G
17 POWDER, FOR SOLUTION ORAL AT BEDTIME
Refills: 0 | Status: DISCONTINUED | OUTPATIENT
Start: 2022-05-20 | End: 2022-05-22

## 2022-05-19 RX ORDER — ASPIRIN/CALCIUM CARB/MAGNESIUM 324 MG
81 TABLET ORAL
Refills: 0 | Status: DISCONTINUED | OUTPATIENT
Start: 2022-05-20 | End: 2022-05-22

## 2022-05-19 RX ORDER — LEVOTHYROXINE SODIUM 125 MCG
25 TABLET ORAL DAILY
Refills: 0 | Status: DISCONTINUED | OUTPATIENT
Start: 2022-05-20 | End: 2022-05-22

## 2022-05-19 RX ORDER — TRAMADOL HYDROCHLORIDE 50 MG/1
50 TABLET ORAL EVERY 6 HOURS
Refills: 0 | Status: DISCONTINUED | OUTPATIENT
Start: 2022-05-20 | End: 2022-05-22

## 2022-05-19 RX ORDER — ACETAMINOPHEN 500 MG
1000 TABLET ORAL ONCE
Refills: 0 | Status: COMPLETED | OUTPATIENT
Start: 2022-05-21 | End: 2022-05-21

## 2022-05-20 ENCOUNTER — TRANSCRIPTION ENCOUNTER (OUTPATIENT)
Age: 62
End: 2022-05-20

## 2022-05-20 ENCOUNTER — INPATIENT (INPATIENT)
Facility: HOSPITAL | Age: 62
LOS: 1 days | Discharge: HOME | End: 2022-05-22
Attending: ORTHOPAEDIC SURGERY | Admitting: ORTHOPAEDIC SURGERY
Payer: MEDICARE

## 2022-05-20 ENCOUNTER — APPOINTMENT (OUTPATIENT)
Dept: ORTHOPEDIC SURGERY | Facility: HOSPITAL | Age: 62
End: 2022-05-20

## 2022-05-20 VITALS
HEART RATE: 89 BPM | HEIGHT: 61 IN | TEMPERATURE: 98 F | DIASTOLIC BLOOD PRESSURE: 75 MMHG | SYSTOLIC BLOOD PRESSURE: 125 MMHG | RESPIRATION RATE: 17 BRPM | WEIGHT: 145.06 LBS | OXYGEN SATURATION: 99 %

## 2022-05-20 DIAGNOSIS — Z98.890 OTHER SPECIFIED POSTPROCEDURAL STATES: Chronic | ICD-10-CM

## 2022-05-20 LAB
ANION GAP SERPL CALC-SCNC: 4 MMOL/L — LOW (ref 5–17)
BLD GP AB SCN SERPL QL: SIGNIFICANT CHANGE UP
BUN SERPL-MCNC: 15 MG/DL — SIGNIFICANT CHANGE UP (ref 7–23)
CALCIUM SERPL-MCNC: 8.9 MG/DL — SIGNIFICANT CHANGE UP (ref 8.5–10.1)
CHLORIDE SERPL-SCNC: 111 MMOL/L — HIGH (ref 96–108)
CO2 SERPL-SCNC: 28 MMOL/L — SIGNIFICANT CHANGE UP (ref 22–31)
CREAT SERPL-MCNC: 0.66 MG/DL — SIGNIFICANT CHANGE UP (ref 0.5–1.3)
EGFR: 100 ML/MIN/1.73M2 — SIGNIFICANT CHANGE UP
GLUCOSE SERPL-MCNC: 108 MG/DL — HIGH (ref 70–99)
HCT VFR BLD CALC: 37.3 % — SIGNIFICANT CHANGE UP (ref 34.5–45)
HGB BLD-MCNC: 12.1 G/DL — SIGNIFICANT CHANGE UP (ref 11.5–15.5)
MCHC RBC-ENTMCNC: 29.3 PG — SIGNIFICANT CHANGE UP (ref 27–34)
MCHC RBC-ENTMCNC: 32.4 G/DL — SIGNIFICANT CHANGE UP (ref 32–36)
MCV RBC AUTO: 90.3 FL — SIGNIFICANT CHANGE UP (ref 80–100)
NRBC # BLD: 0 /100 WBCS — SIGNIFICANT CHANGE UP (ref 0–0)
PLATELET # BLD AUTO: 221 K/UL — SIGNIFICANT CHANGE UP (ref 150–400)
POTASSIUM SERPL-MCNC: 4.1 MMOL/L — SIGNIFICANT CHANGE UP (ref 3.5–5.3)
POTASSIUM SERPL-SCNC: 4.1 MMOL/L — SIGNIFICANT CHANGE UP (ref 3.5–5.3)
RBC # BLD: 4.13 M/UL — SIGNIFICANT CHANGE UP (ref 3.8–5.2)
RBC # FLD: 12.8 % — SIGNIFICANT CHANGE UP (ref 10.3–14.5)
SODIUM SERPL-SCNC: 143 MMOL/L — SIGNIFICANT CHANGE UP (ref 135–145)
WBC # BLD: 8.9 K/UL — SIGNIFICANT CHANGE UP (ref 3.8–10.5)
WBC # FLD AUTO: 8.9 K/UL — SIGNIFICANT CHANGE UP (ref 3.8–10.5)

## 2022-05-20 PROCEDURE — 27130 TOTAL HIP ARTHROPLASTY: CPT | Mod: RT

## 2022-05-20 PROCEDURE — 73501 X-RAY EXAM HIP UNI 1 VIEW: CPT | Mod: 26,RT

## 2022-05-20 DEVICE — IMPLANTABLE DEVICE: Type: IMPLANTABLE DEVICE | Site: RIGHT | Status: FUNCTIONAL

## 2022-05-20 DEVICE — SHELL ACT MULTIHOLE TRIDENT II D 48MM: Type: IMPLANTABLE DEVICE | Site: RIGHT | Status: FUNCTIONAL

## 2022-05-20 DEVICE — KIT PREP IM TOT HIP: Type: IMPLANTABLE DEVICE | Site: RIGHT | Status: FUNCTIONAL

## 2022-05-20 DEVICE — FEM HEAD V40 32MM -4MM: Type: IMPLANTABLE DEVICE | Site: RIGHT | Status: FUNCTIONAL

## 2022-05-20 DEVICE — LINER ACET TRIDENT X3 0 DEG 32MM D: Type: IMPLANTABLE DEVICE | Site: RIGHT | Status: FUNCTIONAL

## 2022-05-20 RX ORDER — TRAMADOL HYDROCHLORIDE 50 MG/1
1 TABLET ORAL
Qty: 40 | Refills: 0
Start: 2022-05-20 | End: 2022-08-30

## 2022-05-20 RX ORDER — ONDANSETRON 8 MG/1
4 TABLET, FILM COATED ORAL ONCE
Refills: 0 | Status: DISCONTINUED | OUTPATIENT
Start: 2022-05-20 | End: 2022-05-20

## 2022-05-20 RX ORDER — ASPIRIN/CALCIUM CARB/MAGNESIUM 324 MG
1 TABLET ORAL
Qty: 0 | Refills: 0 | DISCHARGE

## 2022-05-20 RX ORDER — SODIUM CHLORIDE 9 MG/ML
500 INJECTION, SOLUTION INTRAVENOUS ONCE
Refills: 0 | Status: COMPLETED | OUTPATIENT
Start: 2022-05-20 | End: 2022-05-20

## 2022-05-20 RX ORDER — ASPIRIN/CALCIUM CARB/MAGNESIUM 324 MG
1 TABLET ORAL
Qty: 60 | Refills: 0
Start: 2022-05-20 | End: 2022-06-18

## 2022-05-20 RX ORDER — OXYCODONE HYDROCHLORIDE 5 MG/1
1 TABLET ORAL
Qty: 20 | Refills: 0
Start: 2022-05-20 | End: 2022-05-24

## 2022-05-20 RX ORDER — ASPIRIN/CALCIUM CARB/MAGNESIUM 324 MG
1 TABLET ORAL
Qty: 0 | Refills: 0 | DISCHARGE
Start: 2022-05-20

## 2022-05-20 RX ORDER — TRAMADOL HYDROCHLORIDE 50 MG/1
1 TABLET ORAL
Qty: 40 | Refills: 0
Start: 2022-05-20 | End: 2022-05-29

## 2022-05-20 RX ORDER — ACETAMINOPHEN 500 MG
2 TABLET ORAL
Qty: 168 | Refills: 0
Start: 2022-05-20 | End: 2022-06-16

## 2022-05-20 RX ORDER — SODIUM CHLORIDE 9 MG/ML
1000 INJECTION, SOLUTION INTRAVENOUS
Refills: 0 | Status: DISCONTINUED | OUTPATIENT
Start: 2022-05-20 | End: 2022-05-20

## 2022-05-20 RX ORDER — OMEPRAZOLE 10 MG/1
1 CAPSULE, DELAYED RELEASE ORAL
Qty: 28 | Refills: 0
Start: 2022-05-20 | End: 2022-06-16

## 2022-05-20 RX ORDER — DOCUSATE SODIUM 100 MG
1 CAPSULE ORAL
Qty: 14 | Refills: 0
Start: 2022-05-20 | End: 2022-05-26

## 2022-05-20 RX ORDER — CEFAZOLIN SODIUM 1 G
2000 VIAL (EA) INJECTION EVERY 8 HOURS
Refills: 0 | Status: COMPLETED | OUTPATIENT
Start: 2022-05-20 | End: 2022-05-21

## 2022-05-20 RX ORDER — ONDANSETRON 8 MG/1
1 TABLET, FILM COATED ORAL
Qty: 28 | Refills: 0
Start: 2022-05-20 | End: 2022-05-26

## 2022-05-20 RX ORDER — SODIUM CHLORIDE 9 MG/ML
3 INJECTION INTRAMUSCULAR; INTRAVENOUS; SUBCUTANEOUS EVERY 8 HOURS
Refills: 0 | Status: DISCONTINUED | OUTPATIENT
Start: 2022-05-20 | End: 2022-05-20

## 2022-05-20 RX ORDER — ACETAMINOPHEN 500 MG
650 TABLET ORAL ONCE
Refills: 0 | Status: COMPLETED | OUTPATIENT
Start: 2022-05-20 | End: 2022-05-20

## 2022-05-20 RX ADMIN — SODIUM CHLORIDE 500 MILLILITER(S): 9 INJECTION, SOLUTION INTRAVENOUS at 11:43

## 2022-05-20 RX ADMIN — Medication 650 MILLIGRAM(S): at 07:53

## 2022-05-20 RX ADMIN — Medication 81 MILLIGRAM(S): at 17:19

## 2022-05-20 RX ADMIN — Medication 1000 MILLIGRAM(S): at 16:30

## 2022-05-20 RX ADMIN — Medication 500 MILLIGRAM(S): at 17:19

## 2022-05-20 RX ADMIN — Medication 400 MILLIGRAM(S): at 15:56

## 2022-05-20 RX ADMIN — SODIUM CHLORIDE 75 MILLILITER(S): 9 INJECTION, SOLUTION INTRAVENOUS at 11:42

## 2022-05-20 RX ADMIN — Medication 100 MILLIGRAM(S): at 17:19

## 2022-05-20 RX ADMIN — SODIUM CHLORIDE 500 MILLILITER(S): 9 INJECTION, SOLUTION INTRAVENOUS at 12:55

## 2022-05-20 RX ADMIN — Medication 15 MILLIGRAM(S): at 17:35

## 2022-05-20 RX ADMIN — ATORVASTATIN CALCIUM 10 MILLIGRAM(S): 80 TABLET, FILM COATED ORAL at 22:50

## 2022-05-20 RX ADMIN — Medication 15 MILLIGRAM(S): at 17:19

## 2022-05-20 NOTE — OCCUPATIONAL THERAPY INITIAL EVALUATION ADULT - ADL RETRAINING, OT EVAL
Pt will perform all aspects of lower body self care independently with hip kit /set up within one week.

## 2022-05-20 NOTE — OCCUPATIONAL THERAPY INITIAL EVALUATION ADULT - PLANNED THERAPY INTERVENTIONS, OT EVAL
energy conservation techniques/ADL retraining/IADL retraining/balance training/bed mobility training/neuromuscular re-education/parent/caregiver training.../ROM/strengthening/transfer training

## 2022-05-20 NOTE — DISCHARGE NOTE PROVIDER - CARE PROVIDER_API CALL
Adonis Carballo)  Orthopaedic Surgery  [address], [address]  [place] [address]  Phone: [phone]  Fax: [phone]  Follow Up Time:

## 2022-05-20 NOTE — PROGRESS NOTE ADULT - SUBJECTIVE AND OBJECTIVE BOX
Orthopedics Post-op Check:    This is a 61y/o Female s/p Right Total Hip Arthroplasty   Pain is controlled. Pt feeling well. No nausea or vomiting.     Vital Signs Last 24 Hrs  T(C): 36.9 (05-20-22 @ 12:28), Max: 36.9 (05-20-22 @ 12:28)  T(F): 98.4 (05-20-22 @ 12:28), Max: 98.4 (05-20-22 @ 12:28)  HR: 75 (05-20-22 @ 12:28) (74 - 89)  BP: 121/78 (05-20-22 @ 12:28) (104/70 - 125/75)  BP(mean): --  RR: 16 (05-20-22 @ 12:28) (12 - 21)  SpO2: 98% (05-20-22 @ 12:28) (94% - 99%)                        12.1   8.90  )-----------( 221      ( 20 May 2022 12:28 )             37.3     20 May 2022 12:28    143    |  111    |  15     ----------------------------<  108    4.1     |  28     |  0.66     Ca    8.9        20 May 2022 12:28          Exam:  NAD AAOx3.  Dressing clean, dry and intact.  SCDs in place.  Calves are soft and nontender.  Moving all toes and ankle, +EHL/FHL/TA/GS.  Sensation present but diminished throuought.  DP pulses 2+.    A/P:  Stable POD 0 Right Total Hip Arthroplasty  -Analgesia  -Ice application  -Prophylactic antibiotics  -DVT PE prophylaxis  -Incentive spirometry  -OOB PT WBAT RLE

## 2022-05-20 NOTE — PHYSICAL THERAPY INITIAL EVALUATION ADULT - ADDITIONAL COMMENTS
Preop info confirmed. There are 2 steps, c far jose rails,  at the entry of the house and no steps to negotiate at home. Pt has a tub/shower combo c fixed shower head, no grab bar, and regular toilet seat  in BR. 3-1 commode will fit in BR. Average pain level is 10/10 at all times. Pt does not take any pain meds nor apply modalities for pain management. Pt has experience of adverse effects with Naproxen. Pt wears glasses for reading and distance and  no need for hearing aid. Pt is R handed and drives.

## 2022-05-20 NOTE — DISCHARGE NOTE PROVIDER - HOSPITAL COURSE
H&P:  Pt is a 62y Female  PAST MEDICAL & SURGICAL HISTORY:  Hip dysplasia, congenital      Spinal stenosis      Post concussion syndrome      HLD (hyperlipidemia)      Herniated intervertebral disc of lumbar spine      H/O sinus surgery  5/1997 and 5/1999           Now s/p Right Total Hip Arthroplasty. Pt is afebrile with stable vital signs. Pain is controlled. Alert and Oriented. Exam reveals intact EHL FHL TA GS, +DP. Dressing is clean and dry with an Aquacel bandage on.    Hospital Course:  Patient presented to [facility] medically cleared for elective Right Hip Replacement Surgery, having failed outpatient conservative management. Prophylactic antibiotics were started before the procedure and continued for 24 hours. They were admitted after surgery to the orthopedic floor.   There were no complications during the hospital stay. All home medications were continued.     Routine consults were obtained from Physical Therapy for PT. Patient was placed on anticoagulation with Aspirin 81 mg .  Pertinent home medications were continued.  Daily labs were followed.      POD 0 pt was stable overnight.  Pt received PT twice daily.  The orthopedic Attending is aware and agrees.

## 2022-05-20 NOTE — OCCUPATIONAL THERAPY INITIAL EVALUATION ADULT - LIVES WITH, PROFILE
her mother in a private house with entry steps equipped with bilateral hand rails that cannot be reached simultaneously . Most living amenities are located on one level.  The bathroom has a tub/shower combination, fixed shower head and standard toilet with adequate space to fit a commode over it.

## 2022-05-20 NOTE — ASU DISCHARGE PLAN (ADULT/PEDIATRIC) - CARE PROVIDER_API CALL
Adonis Carballo)  Orthopaedic Surgery  [address], [address]  [place] [address]  Phone: [phone]  Fax: [phone]  Follow Up Time:

## 2022-05-20 NOTE — OCCUPATIONAL THERAPY INITIAL EVALUATION ADULT - ANTICIPATED DISCHARGE DISPOSITION, OT EVAL
Recommend home with 3-in-1 commode, and OT referral to enable patient to safely perform ADL management and functional mobility. Pt has a rolling walker and SAC.

## 2022-05-20 NOTE — OCCUPATIONAL THERAPY INITIAL EVALUATION ADULT - GENERAL OBSERVATIONS, REHAB EVAL
Pt was seen for initial OT consult, encountered in bed in NAD  with IV Heplock in place. right hip dressing is clean dry and intact. Grade 1+ edema with decreased ROM and muscle strength in RLE. Pt was AA&Ox4, cooperative & followed commands. Pt c/o right hip pain due to s/p  anterior hip THR; this limits pt's activity tolerance ,balance, ADL management and functional mobility.  Pt performed bed mobility with mod assist.

## 2022-05-20 NOTE — PHYSICAL THERAPY INITIAL EVALUATION ADULT - ACTIVE RANGE OF MOTION EXAMINATION, REHAB EVAL
except R hip WFL within anterior hip precautions/bilateral upper extremity Active ROM was WFL (within functional limits)/bilateral  lower extremity Active ROM was WFL (within functional limits)

## 2022-05-20 NOTE — OCCUPATIONAL THERAPY INITIAL EVALUATION ADULT - RANGE OF MOTION EXAMINATION, LOWER EXTREMITY
AAROM in right hip is 0-45/Left LE Active ROM was WFL (within functional limits)/Left LE Passive ROM was WFL (w/i functional limits)

## 2022-05-20 NOTE — OCCUPATIONAL THERAPY INITIAL EVALUATION ADULT - PERTINENT HX OF CURRENT PROBLEM, REHAB EVAL
Pt is a 61 y/o admitted for elective surgery for right anterior THR wit MD Villalobos  today  due to OA, chronic pain and DJD.

## 2022-05-20 NOTE — ASU DISCHARGE PLAN (ADULT/PEDIATRIC) - NS MD DC FALL RISK RISK
For information on Fall & Injury Prevention, visit: [email or web address] OR  [email or web address] OR  https://www.cdc.gov/steadi/patient.html

## 2022-05-20 NOTE — PATIENT PROFILE ADULT - FALL HARM RISK - UNIVERSAL INTERVENTIONS
Bed in lowest position, wheels locked, appropriate side rails in place/Call bell, personal items and telephone in reach/Instruct patient to call for assistance before getting out of bed or chair/Non-slip footwear when patient is out of bed/[place] to call system/Physically safe environment - no spills, clutter or unnecessary equipment/Purposeful Proactive Rounding/Room/bathroom lighting operational, light cord in reach

## 2022-05-20 NOTE — ASU DISCHARGE PLAN (ADULT/PEDIATRIC) - ASU DC SPECIAL INSTRUCTIONSFT
1.	Pain Control  2.	Walking with full weight bearing as tolerated, with assistive devices (walker/Cane as Needed)  3.	DVT Prophylaxis- Aspirin 81mg PO BID x 30 days  4.	Tylenol, oxyIR, tramadol, as needed for pain; omeprazole;  5.	Follow up with Dr. Carballo as Outpatient in 14 Days after Discharge from the Hospital. Call Office For Appointment.   6.	Follow exercise program given to you by Dr. Carballo.   7.	Ice affected area as Needed  8.	Keep Dressing Clean and dry. Do not remove until POD #10, please follow the date as written on your aquacel dressing. You may shower with the dressing on, do not submerge in water

## 2022-05-20 NOTE — DISCHARGE NOTE PROVIDER - NSDCFUSCHEDAPPT_GEN_ALL_CORE_FT
[facility] Physician [facility]  ORTHOSURG [address]  Scheduled Appointment: 06/06/2022

## 2022-05-20 NOTE — DISCHARGE NOTE PROVIDER - NSDCFUADDINST_GEN_ALL_CORE_FT
1.	Pain Control  2.	Walking with full weight bearing as tolerated, with assistive devices (walker/Cane as Needed)  3.	DVT Prophylaxis- Aspirin 81mg PO BID x 30 days  4.	Tylenol, oxyIR, tramadol, naproxen as needed for pain; omeprazole.   5.	Follow up with Dr. Carballo as Outpatient in 14 Days after Discharge from the Hospital. Call Office For Appointment.   6.	Follow exercise program given to you by Dr. Carballo.   7.	Ice affected area as Needed  8.	Keep Dressing Clean and dry. Do not remove until POD #10, please follow the date as written on your aquacel dressing. You may shower with the dressing on, do not submerge in water    1.	Pain Control  2.	Walking with full weight bearing as tolerated, with assistive devices (walker/Cane as Needed)  3.	DVT Prophylaxis- Aspirin 81mg PO BID x 30 days  4.	Tylenol, tramadol as needed for pain; omeprazole.   5.	Follow up with Dr. Carballo as Outpatient in 14 Days after Discharge from the Hospital. Call Office For Appointment.   6.	Follow exercise program given to you by Dr. Carballo.   7.	Ice affected area as Needed  8.	Keep Dressing Clean and dry. Do not remove until POD #10, please follow the date as written on your aquacel dressing. You may shower with the dressing on, do not submerge in water

## 2022-05-20 NOTE — PHYSICAL THERAPY INITIAL EVALUATION ADULT - ASR EQUIP NEEDS DISCH PT EVAL
Pt is refusing 3:1 commode from PT dept and [facility]. Pt received prescription from PA to purchase privately. Pt states she has a rolling walker and cane was given to pt by PT. Pt is refusing 3:1 commode from PT dept and [facility]. Pt received prescription from PA to purchase privately. Pt has an old commode that she can use. Pt states she has a rolling walker and cane was given to pt by PT.

## 2022-05-20 NOTE — PHYSICAL THERAPY INITIAL EVALUATION ADULT - IMPAIRMENTS FOUND, PT EVAL
aerobic capacity/endurance/decreased midline orientation/ergonomics and body mechanics/gait, locomotion, and balance/joint integrity and mobility/muscle strength

## 2022-05-20 NOTE — OCCUPATIONAL THERAPY INITIAL EVALUATION ADULT - ADDITIONAL COMMENTS
Prior to admission, pt was functioning in her roles, self sufficient & ambulating independently with a SAC. Presently, pt needs assistance with lower body self care tasks due to pain, weakness, stiffness and decreased ROM from right anterior THR. Pt is right hand dominant and wears glasses for reading. Pt sated " I use orthotic in left shoe because my left leg is shorter that the right leg". Prior to admission, pt was functioning in her roles, self sufficient & ambulating independently with a SAC. Presently, pt needs assistance with lower body self care tasks due to pain, weakness, stiffness and decreased ROM from right anterior THR. Pt is right hand dominant and wears glasses for reading. Pt stated " I use orthotic in left shoe because my left leg is shorter that the right leg".

## 2022-05-20 NOTE — OCCUPATIONAL THERAPY INITIAL EVALUATION ADULT - BALANCE TRAINING, PT EVAL
Pt will increased standing balance from fair- to good in 30 days to prevent falls, optimize pt's ability for ADL management & safely navigate in all terrains

## 2022-05-20 NOTE — DISCHARGE NOTE PROVIDER - CARE PROVIDERS DIRECT ADDRESSES
[email or web address]

## 2022-05-20 NOTE — OCCUPATIONAL THERAPY INITIAL EVALUATION ADULT - SOCIAL CONCERNS
Pt voiced concerns about  her recovery at home. Pt endorsed that her brother will be able to assist her after discharge home post-operatively./Complex psychosocial needs/coping issues

## 2022-05-20 NOTE — DISCHARGE NOTE PROVIDER - NSDCMRMEDTOKEN_GEN_ALL_CORE_FT
Aspirin Enteric Coated 81 mg oral delayed release tablet: 1 tab(s) orally 2 times a day   atorvastatin 10 mg oral tablet: 1 tab(s) orally once a day  Colace 100 mg oral capsule: 1 cap(s) orally 2 times a day, As Needed -for constipation   CoQ10 100 mg oral capsule: 1 cap(s) orally once a day  levothyroxine 25 mcg (0.025 mg) oral capsule: 1 cap(s) orally once a day  omeprazole 40 mg oral delayed release capsule: 1 cap(s) orally once a day MDD:1  oxyCODONE 5 mg oral tablet: 1 tab(s) orally every 6 hours MDD:MDD:4  Tylenol Extra Strength Cool 500 mg oral tablet: 2 tab(s) orally every 8 hours MDD:6 tabs  Ultram 50 mg oral tablet: 1 tab(s) orally every 6 hours, As Needed -for moderate pain MDD:4  Zofran 4 mg oral tablet: 1 tab(s) orally every 6 hours    3 in 1 commode with arm rests: 3 in 1 commode with arm rest  Dx: s/p MIGUEL  Aspirin Enteric Coated 81 mg oral delayed release tablet: 1 tab(s) orally 2 times a day   atorvastatin 10 mg oral tablet: 1 tab(s) orally once a day  Colace 100 mg oral capsule: 1 cap(s) orally 2 times a day, As Needed -for constipation   CoQ10 100 mg oral capsule: 1 cap(s) orally once a day  levothyroxine 25 mcg (0.025 mg) oral capsule: 1 cap(s) orally once a day  omeprazole 40 mg oral delayed release capsule: 1 cap(s) orally once a day MDD:1  oxyCODONE 5 mg oral tablet: 1 tab(s) orally every 6 hours MDD:MDD:4  Tylenol Extra Strength Cool 500 mg oral tablet: 2 tab(s) orally every 8 hours MDD:6 tabs  Ultram 50 mg oral tablet: 1 tab(s) orally every 6 hours, As Needed -for moderate pain MDD:4  Zofran 4 mg oral tablet: 1 tab(s) orally every 6 hours    3 in 1 commode with arm rests: 3 in 1 commode with arm rest  Dx: s/p MIGUEL  Aspirin Enteric Coated 81 mg oral delayed release tablet: 1 tab(s) orally 2 times a day   atorvastatin 10 mg oral tablet: 1 tab(s) orally once a day  Colace 100 mg oral capsule: 1 cap(s) orally 2 times a day, As Needed -for constipation   CoQ10 100 mg oral capsule: 1 cap(s) orally once a day  levothyroxine 25 mcg (0.025 mg) oral capsule: 1 cap(s) orally once a day  omeprazole 40 mg oral delayed release capsule: 1 cap(s) orally once a day MDD:1  Tylenol Extra Strength Cool 500 mg oral tablet: 2 tab(s) orally every 8 hours MDD:6 tabs  Ultram 50 mg oral tablet: 1 tab(s) orally every 6 hours, As Needed -for moderate pain MDD:4  Zofran 4 mg oral tablet: 1 tab(s) orally every 6 hours

## 2022-05-21 ENCOUNTER — TRANSCRIPTION ENCOUNTER (OUTPATIENT)
Age: 62
End: 2022-05-21

## 2022-05-21 LAB
ANION GAP SERPL CALC-SCNC: 8 MMOL/L — SIGNIFICANT CHANGE UP (ref 5–17)
BUN SERPL-MCNC: 16 MG/DL — SIGNIFICANT CHANGE UP (ref 7–23)
CALCIUM SERPL-MCNC: 8.4 MG/DL — LOW (ref 8.5–10.1)
CHLORIDE SERPL-SCNC: 108 MMOL/L — SIGNIFICANT CHANGE UP (ref 96–108)
CO2 SERPL-SCNC: 26 MMOL/L — SIGNIFICANT CHANGE UP (ref 22–31)
CREAT SERPL-MCNC: 0.62 MG/DL — SIGNIFICANT CHANGE UP (ref 0.5–1.3)
EGFR: 101 ML/MIN/1.73M2 — SIGNIFICANT CHANGE UP
GLUCOSE SERPL-MCNC: 101 MG/DL — HIGH (ref 70–99)
HCT VFR BLD CALC: 34.1 % — LOW (ref 34.5–45)
HGB BLD-MCNC: 10.9 G/DL — LOW (ref 11.5–15.5)
MCHC RBC-ENTMCNC: 28.8 PG — SIGNIFICANT CHANGE UP (ref 27–34)
MCHC RBC-ENTMCNC: 32 G/DL — SIGNIFICANT CHANGE UP (ref 32–36)
MCV RBC AUTO: 90 FL — SIGNIFICANT CHANGE UP (ref 80–100)
NRBC # BLD: 0 /100 WBCS — SIGNIFICANT CHANGE UP (ref 0–0)
PLATELET # BLD AUTO: 223 K/UL — SIGNIFICANT CHANGE UP (ref 150–400)
POTASSIUM SERPL-MCNC: 3.6 MMOL/L — SIGNIFICANT CHANGE UP (ref 3.5–5.3)
POTASSIUM SERPL-SCNC: 3.6 MMOL/L — SIGNIFICANT CHANGE UP (ref 3.5–5.3)
RBC # BLD: 3.79 M/UL — LOW (ref 3.8–5.2)
RBC # FLD: 12.9 % — SIGNIFICANT CHANGE UP (ref 10.3–14.5)
SODIUM SERPL-SCNC: 142 MMOL/L — SIGNIFICANT CHANGE UP (ref 135–145)
WBC # BLD: 8.76 K/UL — SIGNIFICANT CHANGE UP (ref 3.8–10.5)
WBC # FLD AUTO: 8.76 K/UL — SIGNIFICANT CHANGE UP (ref 3.8–10.5)

## 2022-05-21 RX ORDER — OXYCODONE HYDROCHLORIDE 5 MG/1
2.5 TABLET ORAL ONCE
Refills: 0 | Status: DISCONTINUED | OUTPATIENT
Start: 2022-05-21 | End: 2022-05-21

## 2022-05-21 RX ADMIN — PANTOPRAZOLE SODIUM 40 MILLIGRAM(S): 20 TABLET, DELAYED RELEASE ORAL at 06:03

## 2022-05-21 RX ADMIN — Medication 15 MILLIGRAM(S): at 00:22

## 2022-05-21 RX ADMIN — OXYCODONE HYDROCHLORIDE 10 MILLIGRAM(S): 5 TABLET ORAL at 11:50

## 2022-05-21 RX ADMIN — SODIUM CHLORIDE 500 MILLILITER(S): 9 INJECTION, SOLUTION INTRAVENOUS at 06:30

## 2022-05-21 RX ADMIN — Medication 15 MILLIGRAM(S): at 06:18

## 2022-05-21 RX ADMIN — OXYCODONE HYDROCHLORIDE 2.5 MILLIGRAM(S): 5 TABLET ORAL at 01:45

## 2022-05-21 RX ADMIN — Medication 500 MILLIGRAM(S): at 17:58

## 2022-05-21 RX ADMIN — Medication 15 MILLIGRAM(S): at 00:07

## 2022-05-21 RX ADMIN — Medication 25 MICROGRAM(S): at 06:30

## 2022-05-21 RX ADMIN — OXYCODONE HYDROCHLORIDE 5 MILLIGRAM(S): 5 TABLET ORAL at 07:22

## 2022-05-21 RX ADMIN — CELECOXIB 200 MILLIGRAM(S): 200 CAPSULE ORAL at 17:57

## 2022-05-21 RX ADMIN — Medication 102 MILLIGRAM(S): at 06:04

## 2022-05-21 RX ADMIN — Medication 400 MILLIGRAM(S): at 00:43

## 2022-05-21 RX ADMIN — Medication 15 MILLIGRAM(S): at 06:03

## 2022-05-21 RX ADMIN — CELECOXIB 200 MILLIGRAM(S): 200 CAPSULE ORAL at 19:38

## 2022-05-21 RX ADMIN — Medication 81 MILLIGRAM(S): at 06:03

## 2022-05-21 RX ADMIN — Medication 100 MILLIGRAM(S): at 00:06

## 2022-05-21 RX ADMIN — Medication 15 MILLIGRAM(S): at 12:56

## 2022-05-21 RX ADMIN — Medication 1 TABLET(S): at 12:56

## 2022-05-21 RX ADMIN — ATORVASTATIN CALCIUM 10 MILLIGRAM(S): 80 TABLET, FILM COATED ORAL at 22:30

## 2022-05-21 RX ADMIN — OXYCODONE HYDROCHLORIDE 2.5 MILLIGRAM(S): 5 TABLET ORAL at 02:45

## 2022-05-21 RX ADMIN — OXYCODONE HYDROCHLORIDE 5 MILLIGRAM(S): 5 TABLET ORAL at 06:29

## 2022-05-21 RX ADMIN — Medication 15 MILLIGRAM(S): at 13:15

## 2022-05-21 RX ADMIN — ONDANSETRON 4 MILLIGRAM(S): 8 TABLET, FILM COATED ORAL at 14:28

## 2022-05-21 RX ADMIN — Medication 81 MILLIGRAM(S): at 17:58

## 2022-05-21 RX ADMIN — OXYCODONE HYDROCHLORIDE 10 MILLIGRAM(S): 5 TABLET ORAL at 10:52

## 2022-05-21 RX ADMIN — Medication 1000 MILLIGRAM(S): at 00:58

## 2022-05-21 RX ADMIN — Medication 500 MILLIGRAM(S): at 06:03

## 2022-05-21 NOTE — PROGRESS NOTE ADULT - SUBJECTIVE AND OBJECTIVE BOX
This is a 61y/o Female s/p Right Total Hip Arthroplasty   Pain is controlled. Pt feeling well. No nausea or vomiting.     LABS:                        10.9   8.76  )-----------( 223      ( 21 May 2022 06:50 )             34.1     05-21    142  |  108  |  16  ----------------------------<  101<H>  3.6   |  26  |  0.62    Ca    8.4<L>      21 May 2022 06:50            VITAL SIGNS:  T(C): 36.2 (05-21-22 @ 04:42), Max: 37 (05-20-22 @ 15:37)  HR: 66 (05-21-22 @ 04:42) (66 - 83)  BP: 136/83 (05-21-22 @ 04:42) (104/70 - 150/71)  RR: 18 (05-21-22 @ 04:42) (12 - 21)  SpO2: 100% (05-21-22 @ 04:42) (94% - 100%)      Exam:  NAD AAOx3.  Dressing clean, dry and intact.  SCDs in place.  Calves are soft and nontender.  Moving all toes and ankle, +EHL/FHL/TA/GS.  Sensation present but diminished throuought.  DP pulses 2+.    A/P:  Stable POD 1 Right Total Hip Arthroplasty  -Analgesia  -Ice application  -Prophylactic antibiotics  -DVT PE prophylaxis  -Incentive spirometry  -OOB PT WBAT RLE  -DC Planning to Home

## 2022-05-21 NOTE — DISCHARGE NOTE NURSING/CASE MANAGEMENT/SOCIAL WORK - PATIENT PORTAL LINK FT
You can access the FollowMyHealth Patient Portal offered by [facility] by registering at the following website: [email or web address]. By joining [facility]’s FollowMyHealth portal, you will also be able to view your health information using other applications (apps) compatible with our system.

## 2022-05-21 NOTE — DISCHARGE NOTE NURSING/CASE MANAGEMENT/SOCIAL WORK - NSDCPEFALRISK_GEN_ALL_CORE
For information on Fall & Injury Prevention, visit: [email or web address] OR  [email or web address] OR  https://www.cdc.gov/steadi/patient.html

## 2022-05-22 VITALS
TEMPERATURE: 99 F | OXYGEN SATURATION: 98 % | RESPIRATION RATE: 18 BRPM | SYSTOLIC BLOOD PRESSURE: 124 MMHG | DIASTOLIC BLOOD PRESSURE: 72 MMHG | HEART RATE: 84 BPM

## 2022-05-22 RX ORDER — CELECOXIB 200 MG/1
1 CAPSULE ORAL
Qty: 28 | Refills: 0
Start: 2022-05-22

## 2022-05-22 RX ORDER — CELECOXIB 200 MG/1
1 CAPSULE ORAL
Qty: 0 | Refills: 0 | DISCHARGE
Start: 2022-05-22

## 2022-05-22 RX ADMIN — Medication 500 MILLIGRAM(S): at 05:47

## 2022-05-22 RX ADMIN — Medication 1 TABLET(S): at 11:43

## 2022-05-22 RX ADMIN — Medication 25 MICROGRAM(S): at 05:46

## 2022-05-22 RX ADMIN — PANTOPRAZOLE SODIUM 40 MILLIGRAM(S): 20 TABLET, DELAYED RELEASE ORAL at 05:52

## 2022-05-22 RX ADMIN — CELECOXIB 200 MILLIGRAM(S): 200 CAPSULE ORAL at 05:46

## 2022-05-22 RX ADMIN — Medication 81 MILLIGRAM(S): at 05:50

## 2022-05-22 NOTE — PROGRESS NOTE ADULT - SUBJECTIVE AND OBJECTIVE BOX
Orthopedics    Pt seen and examined at the bedside. Pain is well controlled at this time,  no concerns at this time.     Vital Signs Last 24 Hrs  T(C): 36.6 (05-22-22 @ 05:15), Max: 36.9 (05-21-22 @ 08:42)  T(F): 97.9 (05-22-22 @ 05:15), Max: 98.5 (05-21-22 @ 08:42)  HR: 71 (05-22-22 @ 05:15) (59 - 90)  BP: 122/78 (05-22-22 @ 05:15) (105/69 - 133/72)  BP(mean): --  RR: 18 (05-22-22 @ 05:15) (18 - 18)  SpO2: 97% (05-22-22 @ 05:15) (96% - 98%)                        10.9   8.76  )-----------( 223      ( 21 May 2022 06:50 )             34.1     21 May 2022 06:50    142    |  108    |  16     ----------------------------<  101    3.6     |  26     |  0.62     Ca    8.4        21 May 2022 06:50      Exam:  GEN: NAD, awake and alert.  RLE  Dressing clean and dry  +EHL FHL TA GS  SILT L2-S1  +DP  Calf soft and nontender, compartments soft and compressible.      A/P:  62yF s/p R Total Hip Arthroplasty POD #2    -Pain control prn  - DVT ppx  - WBAT/PT/OOB as tolerated   - FU am labs  - Med mgmt, continue home meds.  -Begin D/C planning if stable and  progressing well with PT.

## 2022-05-24 DIAGNOSIS — E78.5 HYPERLIPIDEMIA, UNSPECIFIED: ICD-10-CM

## 2022-05-24 DIAGNOSIS — M16.11 UNILATERAL PRIMARY OSTEOARTHRITIS, RIGHT HIP: ICD-10-CM

## 2022-06-06 ENCOUNTER — APPOINTMENT (OUTPATIENT)
Dept: ORTHOPEDIC SURGERY | Facility: CLINIC | Age: 62
End: 2022-06-06
Payer: MEDICARE

## 2022-06-06 PROCEDURE — 73502 X-RAY EXAM HIP UNI 2-3 VIEWS: CPT | Mod: 26,RT

## 2022-06-06 PROCEDURE — 99024 POSTOP FOLLOW-UP VISIT: CPT

## 2022-06-06 RX ORDER — ONDANSETRON 4 MG/1
4 TABLET ORAL
Qty: 28 | Refills: 0 | Status: ACTIVE | COMMUNITY
Start: 2022-05-20

## 2022-06-06 RX ORDER — TRAMADOL HYDROCHLORIDE 50 MG/1
50 TABLET, COATED ORAL
Qty: 40 | Refills: 0 | Status: ACTIVE | COMMUNITY
Start: 2022-05-20

## 2022-06-06 RX ORDER — ACETAMINOPHEN EXTRA STRENGTH 500 MG/1
500 TABLET ORAL
Qty: 168 | Refills: 0 | Status: ACTIVE | COMMUNITY
Start: 2022-05-20

## 2022-06-06 RX ORDER — ASPIRIN 81 MG/1
81 TABLET, COATED ORAL
Qty: 60 | Refills: 0 | Status: ACTIVE | COMMUNITY
Start: 2022-05-20

## 2022-06-06 RX ORDER — OMEPRAZOLE 40 MG/1
40 CAPSULE, DELAYED RELEASE ORAL
Qty: 28 | Refills: 0 | Status: ACTIVE | COMMUNITY
Start: 2022-05-20

## 2022-06-06 NOTE — HISTORY OF PRESENT ILLNESS
[de-identified] : Status-post right total hip  arthroplasty here for initial postoperative evaluation. Excellent progress is noted in terms of pain and restoration of function. Pain is well controlled with oral medications. There has been no change in medical health since discharge. The patient does require assistive devices.

## 2022-06-06 NOTE — PHYSICAL EXAM
[de-identified] : Well developed, well nourished in no apparent distress, awake, alert and orientated to person, place and time with appropriate mood and affect\par Respirations are even and unlabored. Gait evaluation does not reveal a limp. There is no inguinal adenopathy. The affected limb is well-perfused with palpable pedal pulse, without skin lesions, shows a grossly normal motor and sensory examination. Incision is healed Hip motion is full and painless throughout ROM. Leg lengths are approximately equal  [de-identified] : AP pelvis, AP hip, and lateral x-rays of the right hip were ordered and obtained in the office and demonstrate satisfactory position and alignment of the components are present. No signs of loosening are seen.

## 2022-06-06 NOTE — DISCUSSION/SUMMARY
[de-identified] : The patient is doing well after joint replacement surgery. Written infectious precautions were reviewed. The patient will progress with physical therapy at this time and they will work on transitioning from requiring assistive devices for ambulation. Anti-coagulant therapy will be discontinued at 1 month post surgery for the purpose of orthopedic thromboembolism prophylaxis. Return around the 6 week anniversary from surgery for follow-up evaluation.

## 2022-06-15 RX ORDER — CELECOXIB 200 MG/1
200 CAPSULE ORAL DAILY
Qty: 60 | Refills: 2 | Status: ACTIVE | COMMUNITY
Start: 2022-05-22 | End: 1900-01-01

## 2022-06-28 ENCOUNTER — APPOINTMENT (OUTPATIENT)
Dept: ORTHOPEDIC SURGERY | Facility: CLINIC | Age: 62
End: 2022-06-28

## 2022-06-28 DIAGNOSIS — M25.552 PAIN IN RIGHT HIP: ICD-10-CM

## 2022-06-28 DIAGNOSIS — M16.0 BILATERAL PRIMARY OSTEOARTHRITIS OF HIP: ICD-10-CM

## 2022-06-28 DIAGNOSIS — M16.11 UNILATERAL PRIMARY OSTEOARTHRITIS, RIGHT HIP: ICD-10-CM

## 2022-06-28 DIAGNOSIS — M25.551 PAIN IN RIGHT HIP: ICD-10-CM

## 2022-06-28 PROCEDURE — 73523 X-RAY EXAM HIPS BI 5/> VIEWS: CPT

## 2022-06-28 PROCEDURE — 99215 OFFICE O/P EST HI 40 MIN: CPT | Mod: 24

## 2022-06-28 NOTE — HISTORY OF PRESENT ILLNESS
[de-identified] : This is very nice 62-year-old female experiencing left hip and groin and thigh pain, which is severe in intensity.  She has known severe left hip osteoarthritis.  The pain substantially limits activities of daily living. Walking tolerance is reduced. Medication and activity modification have been minimally effective for a period lasting greater than three months in duration. Assistive devices and external support were not deemed by the patient to be helpful in improving their function. Due to the severity of osteoarthritis and level of pain, physical therapy is contraindicated. Pain and restriction of function are intolerable at this time. The patient denies any radiation of the pain to the feet and it is not associated with numbness, tingling, or weakness.  She is recovered very nicely from right total hip arthroplasty.

## 2022-06-28 NOTE — PHYSICAL EXAM
[de-identified] : Patient is well nourished, well-developed, in no acute distress, with appropriate mood and affect. The patient is oriented to time, place, and person. Respirations are even and unlabored. Gait evaluation reveals a limp. There is no inguinal adenopathy.  The right limb is well-perfused and showed 2+ dp/pt pulses, without skin lesions, shows a grossly normal motor and sensory examination. Examination of the hip shows a well-healed surgical scar. Hip motion is painless. FADIR is negative and DEANGELO is negative and Stinchfield test is negative.  The left limb is well-perfused and showed 2+ dp/pt pulses, without skin lesions, shows a grossly normal motor and sensory examination. Examination of the hip shows no skin lesions. Hip motion is reduced and causes pain. FADIR is positive and DEANGELO is positive. Stinchfield test is positive.  Leg lengths are approximately equal. Both hips are stable and muscle strength is normal with good strength with resisted abduction and adduction. Pedal pulses are palpable. [de-identified] : AP and lateral x-rays of the left hip, pelvis, and femur were ordered and taken in the office and demonstrate degenerative joint disease of the hip with joint space narrowing, osteophyte formation, and subchondral sclerosis.\par \par AP pelvis, AP hip, and lateral x-rays of the right hip were ordered and obtained in the office and demonstrate satisfactory position and alignment of the components are present. No signs of loosening are seen.

## 2022-06-28 NOTE — DISCUSSION/SUMMARY
[de-identified] : Recovering very nicely from a right total of arthroplasty.  She has severe left hip osteoarthritis.  She has failed a course of conservative management and would like to proceed with a direct anterior approach left total hip arthroplasty.  \par \par The patient is an appropriate candidate for consideration of left total hip replacement. An extensive discussion was conducted of the natural history of the disease and the variety of surgical and non-surgical treatment options available to the patient. A risk/benefit analysis was discussed with the patient reviewing the advantages and disadvantages of surgical intervention at this time. Both the level and length of the patient's pain have made additional conservative treatment measures consisting of NSAIDs, physical therapy, and/or corticosteroids contraindicated. A full explanation was given of the nature and the purpose of the procedure and anesthesia, its benefits, possible alternative methods of diagnosis or treatment, the risks involved, the possibility of complications, the foreseeable consequences of the procedure and the possible results of the non-treatment. I reviewed the plan of care as well as used a model of a total hip implant equivalent to the one that will be used for their total hip joint replacement. The ability to secure the implant utilizing cement or cementless (press-fit) was discussed with the patient. The patient agrees with the plan of care, as well as the use of implants for their total hip replacement. \par \par No guarantee or assurance was made as to the results that may be obtained. Specifically, the risks were identified to include, but are not limited to the following: Infection, phlebitis, pulmonary embolism, death, paralysis, dislocation, pain, stiffness, instability, limp, weakness, breakage, leg-length inequality, uncontrolled bleeding, nerve injury, blood vessel injury, pressure sores, anesthetic risks, delayed healing of wound and bone, and wear and loosening. Further discussion was undertaken with the patient about the details of surgical preparation, treatment, and postoperative rehabilitation including medical clearance, autotransfusion, the hospital course, and the postoperative rehabilitation involved. All in all, I feel that this patient is a good candidate for surgical reconstruction.\par \par The patient and I discussed the current SARS-CoV-2 (COVID-19) pandemic which has affected our local hospitals. We discussed that our hospitals treat patients with COVID-19. All efforts will be made to avoid cohorting the patient with diagnosed or suspected COVID-19 patient. They also understand that we will screen them 24-48 hours prior to surgery. Despite our best efforts, there is a potential risk for iatrogenic transmission of COVID-19 to the patient during the perioperative period. Juan David COVID-19 during the perioperative period may increase the patient´s risks of an adverse outcome including postoperative pneumonia, difficulty breathing, requirement for a breathing tube (general endotracheal intubation), and death. The patient is understanding of this risk, and is willing to proceed with surgery at this time.

## 2022-07-04 ENCOUNTER — INPATIENT (INPATIENT)
Facility: HOSPITAL | Age: 62
LOS: 0 days | Discharge: HOME | DRG: 603 | End: 2022-07-05
Attending: INTERNAL MEDICINE | Admitting: INTERNAL MEDICINE
Payer: COMMERCIAL

## 2022-07-04 VITALS
SYSTOLIC BLOOD PRESSURE: 156 MMHG | RESPIRATION RATE: 18 BRPM | DIASTOLIC BLOOD PRESSURE: 73 MMHG | HEIGHT: 61 IN | WEIGHT: 145.06 LBS | TEMPERATURE: 98 F | HEART RATE: 96 BPM | OXYGEN SATURATION: 96 %

## 2022-07-04 DIAGNOSIS — Z98.890 OTHER SPECIFIED POSTPROCEDURAL STATES: Chronic | ICD-10-CM

## 2022-07-04 DIAGNOSIS — Z96.641 PRESENCE OF RIGHT ARTIFICIAL HIP JOINT: Chronic | ICD-10-CM

## 2022-07-04 LAB
ALBUMIN SERPL ELPH-MCNC: 4.3 G/DL — SIGNIFICANT CHANGE UP (ref 3.3–5)
ALP SERPL-CCNC: 94 U/L — SIGNIFICANT CHANGE UP (ref 40–120)
ALT FLD-CCNC: 44 U/L — SIGNIFICANT CHANGE UP (ref 10–45)
ANION GAP SERPL CALC-SCNC: 12 MMOL/L — SIGNIFICANT CHANGE UP (ref 5–17)
APTT BLD: 35.1 SEC — SIGNIFICANT CHANGE UP (ref 27.5–35.5)
AST SERPL-CCNC: 37 U/L — SIGNIFICANT CHANGE UP (ref 10–40)
BILIRUB SERPL-MCNC: 0.4 MG/DL — SIGNIFICANT CHANGE UP (ref 0.2–1.2)
BUN SERPL-MCNC: 14 MG/DL — SIGNIFICANT CHANGE UP (ref 7–23)
CALCIUM SERPL-MCNC: 9.2 MG/DL — SIGNIFICANT CHANGE UP (ref 8.4–10.5)
CHLORIDE SERPL-SCNC: 105 MMOL/L — SIGNIFICANT CHANGE UP (ref 96–108)
CO2 SERPL-SCNC: 23 MMOL/L — SIGNIFICANT CHANGE UP (ref 22–31)
CREAT SERPL-MCNC: 0.59 MG/DL — SIGNIFICANT CHANGE UP (ref 0.5–1.3)
EGFR: 102 ML/MIN/1.73M2 — SIGNIFICANT CHANGE UP
GLUCOSE SERPL-MCNC: 89 MG/DL — SIGNIFICANT CHANGE UP (ref 70–99)
HCT VFR BLD CALC: 36.9 % — SIGNIFICANT CHANGE UP (ref 34.5–45)
HGB BLD-MCNC: 11.8 G/DL — SIGNIFICANT CHANGE UP (ref 11.5–15.5)
INR BLD: 0.99 RATIO — SIGNIFICANT CHANGE UP (ref 0.88–1.16)
MCHC RBC-ENTMCNC: 28.3 PG — SIGNIFICANT CHANGE UP (ref 27–34)
MCHC RBC-ENTMCNC: 32 GM/DL — SIGNIFICANT CHANGE UP (ref 32–36)
MCV RBC AUTO: 88.5 FL — SIGNIFICANT CHANGE UP (ref 80–100)
NRBC # BLD: 0 /100 WBCS — SIGNIFICANT CHANGE UP (ref 0–0)
PLATELET # BLD AUTO: 276 K/UL — SIGNIFICANT CHANGE UP (ref 150–400)
POTASSIUM SERPL-MCNC: 4.1 MMOL/L — SIGNIFICANT CHANGE UP (ref 3.5–5.3)
POTASSIUM SERPL-SCNC: 4.1 MMOL/L — SIGNIFICANT CHANGE UP (ref 3.5–5.3)
PROT SERPL-MCNC: 7.3 G/DL — SIGNIFICANT CHANGE UP (ref 6–8.3)
PROTHROM AB SERPL-ACNC: 11.4 SEC — SIGNIFICANT CHANGE UP (ref 10.5–13.4)
RBC # BLD: 4.17 M/UL — SIGNIFICANT CHANGE UP (ref 3.8–5.2)
RBC # FLD: 13.3 % — SIGNIFICANT CHANGE UP (ref 10.3–14.5)
SARS-COV-2 RNA SPEC QL NAA+PROBE: SIGNIFICANT CHANGE UP
SODIUM SERPL-SCNC: 140 MMOL/L — SIGNIFICANT CHANGE UP (ref 135–145)
WBC # BLD: 6.99 K/UL — SIGNIFICANT CHANGE UP (ref 3.8–10.5)
WBC # FLD AUTO: 6.99 K/UL — SIGNIFICANT CHANGE UP (ref 3.8–10.5)

## 2022-07-04 PROCEDURE — 99220: CPT

## 2022-07-04 PROCEDURE — 93971 EXTREMITY STUDY: CPT | Mod: 26,LT

## 2022-07-04 PROCEDURE — 73502 X-RAY EXAM HIP UNI 2-3 VIEWS: CPT | Mod: 26,RT

## 2022-07-04 RX ORDER — ASPIRIN/CALCIUM CARB/MAGNESIUM 324 MG
81 TABLET ORAL DAILY
Refills: 0 | Status: DISCONTINUED | OUTPATIENT
Start: 2022-07-04 | End: 2022-07-05

## 2022-07-04 RX ORDER — SODIUM CHLORIDE 9 MG/ML
1000 INJECTION INTRAMUSCULAR; INTRAVENOUS; SUBCUTANEOUS ONCE
Refills: 0 | Status: COMPLETED | OUTPATIENT
Start: 2022-07-04 | End: 2022-07-04

## 2022-07-04 RX ORDER — CELECOXIB 200 MG/1
200 CAPSULE ORAL DAILY
Refills: 0 | Status: DISCONTINUED | OUTPATIENT
Start: 2022-07-04 | End: 2022-07-05

## 2022-07-04 RX ORDER — CHOLECALCIFEROL (VITAMIN D3) 125 MCG
2000 CAPSULE ORAL DAILY
Refills: 0 | Status: DISCONTINUED | OUTPATIENT
Start: 2022-07-04 | End: 2022-07-05

## 2022-07-04 RX ORDER — ASCORBIC ACID 60 MG
500 TABLET,CHEWABLE ORAL DAILY
Refills: 0 | Status: DISCONTINUED | OUTPATIENT
Start: 2022-07-04 | End: 2022-07-05

## 2022-07-04 RX ORDER — LEVOTHYROXINE SODIUM 125 MCG
25 TABLET ORAL DAILY
Refills: 0 | Status: DISCONTINUED | OUTPATIENT
Start: 2022-07-04 | End: 2022-07-05

## 2022-07-04 RX ORDER — ATORVASTATIN CALCIUM 80 MG/1
10 TABLET, FILM COATED ORAL AT BEDTIME
Refills: 0 | Status: DISCONTINUED | OUTPATIENT
Start: 2022-07-04 | End: 2022-07-05

## 2022-07-04 RX ADMIN — Medication 500 MILLIGRAM(S): at 23:28

## 2022-07-04 RX ADMIN — SODIUM CHLORIDE 1000 MILLILITER(S): 9 INJECTION INTRAMUSCULAR; INTRAVENOUS; SUBCUTANEOUS at 16:22

## 2022-07-04 RX ADMIN — Medication 100 MILLIGRAM(S): at 16:45

## 2022-07-04 RX ADMIN — ATORVASTATIN CALCIUM 10 MILLIGRAM(S): 80 TABLET, FILM COATED ORAL at 23:10

## 2022-07-04 NOTE — ED PROVIDER NOTE - PHYSICAL EXAMINATION
A&Ox3, NAD, well appearing  NCAT. PERRL, EOMI.  Neck supple, no LAD.   Lungs CTAB. No w/r/r  Cardiac +S1S2, RRR, No m/r/g.   Abd soft, NT/ND, +BS  Extremities: cap refill <2, pulses in distal extremities 4+, no edema.   Skin: +pedal edema with anterior ankle with mild erythema and warmth, + small superficial abrasions, no fb or discharge,   No focal Defecits, Strength 5/5 UE/LE.

## 2022-07-04 NOTE — CONSULT NOTE ADULT - NSCONSULTADDITIONALINFOA_GEN_ALL_CORE
I agree with the above note on this patient. All pertinent films have been reviewed. Please refer to clinical documentation of the history, physical examinations, data summary, and both assessment and plan as documented above and with which I agree.    patient in ED for concern of cellulitis, ESR elevated, CRP normal  on doxy per ED  NOT PCN allergic, tolerated cefazolin in OR  suggest ancef + vanc or ancef + zosyn for 24 hours until improvement or resolution of erythema and po course abx for 7-14 days per medicine team  strict elevation x 24 hours  warm compresses    Adonis Carballo MD  Attending Orthopedic Surgeon

## 2022-07-04 NOTE — ED ADULT NURSE REASSESSMENT NOTE - NS ED NURSE REASSESS COMMENT FT1
Report received from ONDINA Cordova in CDU at 1900. Placed in CDU for right ankle erythema and swelling; reports to pain 5/10 upon assessment. refusing pain medications at this time. vss.

## 2022-07-04 NOTE — ED CDU PROVIDER DISPOSITION NOTE - CLINICAL COURSE
63 y/o female chronic venous insufficiency presenting with a few days of right ankle eythrema and weeping serous vesicles. no constitutional symptoms. patient placed in CDU for IV abx and continued monitoring.   overnight,

## 2022-07-04 NOTE — ED CDU PROVIDER INITIAL DAY NOTE - MUSCULOSKELETAL, MLM
RLE with no erythema to the hip, left anterior ankle with mild erythema and warmth, 3 small vesicles overlying area of erythema. outline of margins made. ankle ROM Intact.

## 2022-07-04 NOTE — ED PROVIDER NOTE - OBJECTIVE STATEMENT
61 yo female with recent R. MIGUEL on 5/20/22 here for evaluation of R. foot/ankle swelling and erythema x 2-3 days. Pt states since her hip replacement she has been having some R. ankle swelling but cut herself shaving last week, and 2-3 days ago started to notice some erythema, pain and increased welling in the area. Pt denies fevers or chills, no sob or difficulty breathing, no numbness, tingling, weakness.

## 2022-07-04 NOTE — ED CDU PROVIDER DISPOSITION NOTE - ATTENDING APP SHARED VISIT CONTRIBUTION OF CARE
I personally have seen and examined this patient.  Physician assistant note reviewed and agree on plan of care and except where noted. Patient re-evaluated and doing well.  No acute issues at  this time.  Lab and radiology tests reviewed with patient and/or family.  ortho recommendations with plan for admitting for longer course of iv abx to medicine, cleared otherwise with recent hip surgery, vss, labs wnl, site improving continue iv abx.

## 2022-07-04 NOTE — ED ADULT TRIAGE NOTE - CHIEF COMPLAINT QUOTE
right leg swelling, open sores on top of foot that with +drainage, redness   s/p R hip replacement 5/20

## 2022-07-04 NOTE — ED PROVIDER NOTE - CLINICAL SUMMARY MEDICAL DECISION MAKING FREE TEXT BOX
roger 62 sp hip replacement last month with le neuropathy - and pain  now with inc redness and swelling - small pustuiles with dc noted x 4 - 5 days no fever- concern fo celluloitis - but as pt post op - pocus r/o dv t-- if duplex neg possible obs roger 62 sp hip replacement last month with le neuropathy - and pain  now with inc redness and swelling rle - small pustuiles with dc noted x 4 - 5 days no fever- concern fo celluloitis - but as pt post op - pocus r/o dv t-- if duplex neg possible obs

## 2022-07-04 NOTE — ED PROVIDER NOTE - ATTENDING SHARED VISIT SELECTORS
Isabell Donahue  1960    Medication: VENTOLIN INHALER     Provider: Sylvester Ac MD  Preferred Contact Number: [phone] (mobile)    Pharmacy:  [facility]    Patient instructed to call pharmacy directly for future refills.      Advised patient that the nurse will call if there are questions or concerns, otherwise refill processing may take 48-72 hours.     
Prescription sent to pharmacy.   
Refill request ventolin.  Last office visit 8/5,.  Not on current med list.    
fine  
History/Exam/Medical Decision Making

## 2022-07-04 NOTE — ED ADULT TRIAGE NOTE - SPO2 (%)
Wound check/suture removal:    Chief complaint: wound check. 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 Exam: The surgical site was examined. There is not evidence of infection. There is not erythema. There is not edema. There are 2 suture reactions along the suture line. A/P:  Wound check. The surgical site is healing well. Additional care was reviewed including liberal application of Vaseline several times daily and gentle scar massage starting at 4-6 weeks postop. Follow up will be in about 2 months. 2 small pustules were unroofed and expressed today. 96

## 2022-07-04 NOTE — ED PROVIDER NOTE - ATTENDING APP SHARED VISIT CONTRIBUTION OF CARE
This was a shared visit with SINDHU.  I have reviewed and discussed the case with the SINDHU and agree with verified documentation unless otherwise documented.  I have independently spoken with and examined the patient and my documentation of history/pe and MDM are above.

## 2022-07-04 NOTE — ED PROVIDER NOTE - NS ED ROS FT
Constitutional: No fever or chills  CV: No chest pain  Resp: No SOB  MSK: No musculoskeletal pain  Skin: see hpi  Neuro: No numbness or tingling. No weakness.

## 2022-07-04 NOTE — ED ADULT NURSE NOTE - OBJECTIVE STATEMENT
Patient is a 63 y/o female with PMH of recent R. MIGUEL on 5/20/22 presenting to the ED via waiting room with c/o right foot pain/ankle swelling and erythema x 2-3 days. Patient states since her hip replacement she has had some swelling but it has been worsening. Patient arrives to the ED ambulatory and A&Ox4. Airway patent, breathing unlabored, no accessory muscle use noted, denies SOB. Denies chest pain, peripheral pulses strong, cap refill less than 2 secs. Denies dizziness, denies blurred vision. Abdomen nondistended, denies n/v/d, denies constipation. Denies difficult/painful/frequent urination. Moving all extremities w/o difficulty. Skin warm/intact. Right lower leg swollen. Denies fever, cough, or chills.

## 2022-07-04 NOTE — ED ADULT NURSE NOTE - NSIMPLEMENTINTERV_GEN_ALL_ED
Implemented All Fall Risk Interventions:  [place] to call system. Call bell, personal items and telephone within reach. Instruct patient to call for assistance. Room bathroom lighting operational. Non-slip footwear when patient is off stretcher. Physically safe environment: no spills, clutter or unnecessary equipment. Stretcher in lowest position, wheels locked, appropriate side rails in place. Provide visual cue, wrist band, yellow gown, etc. Monitor gait and stability. Monitor for mental status changes and reorient to person, place, and time. Review medications for side effects contributing to fall risk. Reinforce activity limits and safety measures with patient and family.

## 2022-07-04 NOTE — ED CDU PROVIDER INITIAL DAY NOTE - DATE/TIME 1
.  LABS:                         RADIOLOGY, EKG & ADDITIONAL TESTS: Reviewed. .  LABS:                         RADIOLOGY, EKG & ADDITIONAL TESTS: Reviewed.    VEEG overnight- normal. 04-Jul-2022 19:30

## 2022-07-04 NOTE — ED CDU PROVIDER INITIAL DAY NOTE - OBJECTIVE STATEMENT
63 yo female with recent R. MIGUEL on 5/20/22 here for evaluation of R. foot/ankle swelling and erythema x 2-3 days. Pt states since her hip replacement she has been having some R. ankle swelling but cut herself shaving last week, and 2-3 days ago started to notice some erythema, pain and increased welling in the area. prior to this she intermittently has weeping edema for which she saw a vascular surgeon and was advised she has stage one venous insufficiency and was advised she should wear compression stockings. Pt denies fevers or chills, no sob or difficulty breathing, no numbness, tingling, weakness.

## 2022-07-04 NOTE — ED CDU PROVIDER DISPOSITION NOTE - NSFOLLOWUPINSTRUCTIONS_ED_ALL_ED_FT
Follow up with your Primary Care Physician within the next 2-3 days  Bring a copy of your test results with you to your appointment  Continue your current medication regimen  Take antibiotics as prescribed, finish course even if you note improvement in your symptoms until course complete  Return to the Emergency Room if you experience new or worsening symptoms    Cellulitis    Cellulitis is a skin infection caused by bacteria. This condition occurs most often in the arms and lower legs but can occur anywhere over the body. Symptoms include redness, swelling, warm skin, tenderness, and chills/fever. If you were prescribed an antibiotic medicine, take it as told by your health care provider. Do not stop taking the antibiotic even if you start to feel better.    SEEK IMMEDIATE MEDICAL CARE IF YOU HAVE ANY OF THE FOLLOWING SYMPTOMS: worsening fever, red streaks coming from affected area, vomiting or diarrhea, or dizziness/lightheadedness. Follow up with your Primary Care Physician within the next 2-3 days  Bring a copy of your test results with you to your appointment  Continue your current medication regimen  Take antibiotics as prescribed, finish course even if you note improvement in your symptoms until course complete  Return to the Emergency Room if you experience new or worsening symptoms  you will need a repeat scan of your lower extremity in 1 week to ensure that you do not have a blood clot    Cellulitis    Cellulitis is a skin infection caused by bacteria. This condition occurs most often in the arms and lower legs but can occur anywhere over the body. Symptoms include redness, swelling, warm skin, tenderness, and chills/fever. If you were prescribed an antibiotic medicine, take it as told by your health care provider. Do not stop taking the antibiotic even if you start to feel better.    SEEK IMMEDIATE MEDICAL CARE IF YOU HAVE ANY OF THE FOLLOWING SYMPTOMS: worsening fever, red streaks coming from affected area, vomiting or diarrhea, or dizziness/lightheadedness.

## 2022-07-05 ENCOUNTER — TRANSCRIPTION ENCOUNTER (OUTPATIENT)
Age: 62
End: 2022-07-05

## 2022-07-05 VITALS
OXYGEN SATURATION: 98 % | RESPIRATION RATE: 18 BRPM | HEART RATE: 80 BPM | SYSTOLIC BLOOD PRESSURE: 120 MMHG | DIASTOLIC BLOOD PRESSURE: 64 MMHG | TEMPERATURE: 98 F

## 2022-07-05 DIAGNOSIS — L03.119 CELLULITIS OF UNSPECIFIED PART OF LIMB: ICD-10-CM

## 2022-07-05 LAB
ALBUMIN SERPL ELPH-MCNC: 3.8 G/DL — SIGNIFICANT CHANGE UP (ref 3.3–5)
ALP SERPL-CCNC: 85 U/L — SIGNIFICANT CHANGE UP (ref 40–120)
ALT FLD-CCNC: 47 U/L — HIGH (ref 10–45)
ANION GAP SERPL CALC-SCNC: 10 MMOL/L — SIGNIFICANT CHANGE UP (ref 5–17)
AST SERPL-CCNC: 41 U/L — HIGH (ref 10–40)
BASOPHILS # BLD AUTO: 0.03 K/UL — SIGNIFICANT CHANGE UP (ref 0–0.2)
BASOPHILS NFR BLD AUTO: 0.6 % — SIGNIFICANT CHANGE UP (ref 0–2)
BILIRUB SERPL-MCNC: 0.5 MG/DL — SIGNIFICANT CHANGE UP (ref 0.2–1.2)
BUN SERPL-MCNC: 12 MG/DL — SIGNIFICANT CHANGE UP (ref 7–23)
CALCIUM SERPL-MCNC: 8.9 MG/DL — SIGNIFICANT CHANGE UP (ref 8.4–10.5)
CHLORIDE SERPL-SCNC: 108 MMOL/L — SIGNIFICANT CHANGE UP (ref 96–108)
CO2 SERPL-SCNC: 23 MMOL/L — SIGNIFICANT CHANGE UP (ref 22–31)
CREAT SERPL-MCNC: 0.56 MG/DL — SIGNIFICANT CHANGE UP (ref 0.5–1.3)
CRP SERPL-MCNC: <3 MG/L — SIGNIFICANT CHANGE UP (ref 0–4)
EGFR: 104 ML/MIN/1.73M2 — SIGNIFICANT CHANGE UP
EOSINOPHIL # BLD AUTO: 0.15 K/UL — SIGNIFICANT CHANGE UP (ref 0–0.5)
EOSINOPHIL NFR BLD AUTO: 3.1 % — SIGNIFICANT CHANGE UP (ref 0–6)
ERYTHROCYTE [SEDIMENTATION RATE] IN BLOOD: 43 MM/HR — HIGH (ref 0–20)
GLUCOSE SERPL-MCNC: 93 MG/DL — SIGNIFICANT CHANGE UP (ref 70–99)
HCT VFR BLD CALC: 35.4 % — SIGNIFICANT CHANGE UP (ref 34.5–45)
HGB BLD-MCNC: 11.4 G/DL — LOW (ref 11.5–15.5)
IMM GRANULOCYTES NFR BLD AUTO: 0.2 % — SIGNIFICANT CHANGE UP (ref 0–1.5)
LYMPHOCYTES # BLD AUTO: 1.8 K/UL — SIGNIFICANT CHANGE UP (ref 1–3.3)
LYMPHOCYTES # BLD AUTO: 36.9 % — SIGNIFICANT CHANGE UP (ref 13–44)
MCHC RBC-ENTMCNC: 28.9 PG — SIGNIFICANT CHANGE UP (ref 27–34)
MCHC RBC-ENTMCNC: 32.2 GM/DL — SIGNIFICANT CHANGE UP (ref 32–36)
MCV RBC AUTO: 89.6 FL — SIGNIFICANT CHANGE UP (ref 80–100)
MONOCYTES # BLD AUTO: 0.41 K/UL — SIGNIFICANT CHANGE UP (ref 0–0.9)
MONOCYTES NFR BLD AUTO: 8.4 % — SIGNIFICANT CHANGE UP (ref 2–14)
NEUTROPHILS # BLD AUTO: 2.48 K/UL — SIGNIFICANT CHANGE UP (ref 1.8–7.4)
NEUTROPHILS NFR BLD AUTO: 50.8 % — SIGNIFICANT CHANGE UP (ref 43–77)
NRBC # BLD: 0 /100 WBCS — SIGNIFICANT CHANGE UP (ref 0–0)
PLATELET # BLD AUTO: 245 K/UL — SIGNIFICANT CHANGE UP (ref 150–400)
POTASSIUM SERPL-MCNC: 4 MMOL/L — SIGNIFICANT CHANGE UP (ref 3.5–5.3)
POTASSIUM SERPL-SCNC: 4 MMOL/L — SIGNIFICANT CHANGE UP (ref 3.5–5.3)
PROT SERPL-MCNC: 6.5 G/DL — SIGNIFICANT CHANGE UP (ref 6–8.3)
RBC # BLD: 3.95 M/UL — SIGNIFICANT CHANGE UP (ref 3.8–5.2)
RBC # FLD: 13.2 % — SIGNIFICANT CHANGE UP (ref 10.3–14.5)
SODIUM SERPL-SCNC: 141 MMOL/L — SIGNIFICANT CHANGE UP (ref 135–145)
WBC # BLD: 4.88 K/UL — SIGNIFICANT CHANGE UP (ref 3.8–10.5)
WBC # FLD AUTO: 4.88 K/UL — SIGNIFICANT CHANGE UP (ref 3.8–10.5)

## 2022-07-05 PROCEDURE — 99217: CPT

## 2022-07-05 PROCEDURE — 73502 X-RAY EXAM HIP UNI 2-3 VIEWS: CPT

## 2022-07-05 PROCEDURE — 85730 THROMBOPLASTIN TIME PARTIAL: CPT

## 2022-07-05 PROCEDURE — 86140 C-REACTIVE PROTEIN: CPT

## 2022-07-05 PROCEDURE — 36415 COLL VENOUS BLD VENIPUNCTURE: CPT

## 2022-07-05 PROCEDURE — U0003: CPT

## 2022-07-05 PROCEDURE — 87040 BLOOD CULTURE FOR BACTERIA: CPT

## 2022-07-05 PROCEDURE — 93971 EXTREMITY STUDY: CPT

## 2022-07-05 PROCEDURE — 99222 1ST HOSP IP/OBS MODERATE 55: CPT

## 2022-07-05 PROCEDURE — 96374 THER/PROPH/DIAG INJ IV PUSH: CPT

## 2022-07-05 PROCEDURE — 85652 RBC SED RATE AUTOMATED: CPT

## 2022-07-05 PROCEDURE — 80053 COMPREHEN METABOLIC PANEL: CPT

## 2022-07-05 PROCEDURE — 99285 EMERGENCY DEPT VISIT HI MDM: CPT

## 2022-07-05 PROCEDURE — 85027 COMPLETE CBC AUTOMATED: CPT

## 2022-07-05 PROCEDURE — U0005: CPT

## 2022-07-05 PROCEDURE — 85025 COMPLETE CBC W/AUTO DIFF WBC: CPT

## 2022-07-05 PROCEDURE — G0378: CPT

## 2022-07-05 PROCEDURE — 85610 PROTHROMBIN TIME: CPT

## 2022-07-05 PROCEDURE — 96376 TX/PRO/DX INJ SAME DRUG ADON: CPT

## 2022-07-05 RX ORDER — ATORVASTATIN CALCIUM 80 MG/1
1 TABLET, FILM COATED ORAL
Qty: 30 | Refills: 0
Start: 2022-07-05 | End: 2022-08-03

## 2022-07-05 RX ORDER — ACETAMINOPHEN 500 MG
975 TABLET ORAL ONCE
Refills: 0 | Status: COMPLETED | OUTPATIENT
Start: 2022-07-05 | End: 2022-07-05

## 2022-07-05 RX ORDER — LEVOTHYROXINE SODIUM 125 MCG
1 TABLET ORAL
Qty: 30 | Refills: 0
Start: 2022-07-05 | End: 2022-08-03

## 2022-07-05 RX ORDER — LEVOTHYROXINE SODIUM 125 MCG
1 TABLET ORAL
Qty: 0 | Refills: 0 | DISCHARGE

## 2022-07-05 RX ORDER — CHOLECALCIFEROL (VITAMIN D3) 125 MCG
2000 CAPSULE ORAL
Qty: 30 | Refills: 0
Start: 2022-07-05 | End: 2022-08-03

## 2022-07-05 RX ORDER — VANCOMYCIN HCL 1 G
1000 VIAL (EA) INTRAVENOUS ONCE
Refills: 0 | Status: COMPLETED | OUTPATIENT
Start: 2022-07-05 | End: 2022-07-05

## 2022-07-05 RX ORDER — ASCORBIC ACID 60 MG
1 TABLET,CHEWABLE ORAL
Qty: 0 | Refills: 0 | DISCHARGE
Start: 2022-07-05

## 2022-07-05 RX ADMIN — Medication 100 MILLIGRAM(S): at 05:41

## 2022-07-05 RX ADMIN — Medication 975 MILLIGRAM(S): at 11:11

## 2022-07-05 RX ADMIN — Medication 250 MILLIGRAM(S): at 09:41

## 2022-07-05 RX ADMIN — Medication 2000 UNIT(S): at 09:42

## 2022-07-05 RX ADMIN — Medication 81 MILLIGRAM(S): at 09:43

## 2022-07-05 RX ADMIN — Medication 25 MICROGRAM(S): at 05:41

## 2022-07-05 RX ADMIN — Medication 500 MILLIGRAM(S): at 09:42

## 2022-07-05 RX ADMIN — CELECOXIB 200 MILLIGRAM(S): 200 CAPSULE ORAL at 09:42

## 2022-07-05 NOTE — DISCHARGE NOTE NURSING/CASE MANAGEMENT/SOCIAL WORK - PATIENT PORTAL LINK FT
You can access the FollowMyHealth Patient Portal offered by [facility] by registering at the following website: [email or web address]. By joining [facility]’s FollowMyHealth portal, you will also be able to view your health information using other applications (apps) compatible with our system.

## 2022-07-05 NOTE — ED CDU PROVIDER SUBSEQUENT DAY NOTE - HISTORY
CDU PROGRESS NOTE PA NIGHAT: Pt evaluated by Ortho, recs appreciated. Hip Xray showed No acute periprosthetic fracture or dislocation. No evidence of orthopedic hardware loosening. On exam, +RLE with erythema RIGHT ankle, anteriorly with warmth, unchanged from prior exam. Erythema within outline of margins made. ankle ROM Intact. Will continue abx and monitor.

## 2022-07-05 NOTE — DISCHARGE NOTE PROVIDER - CARE PROVIDER_API CALL
Jim Cross ()  Internal Medicine  [address] [place] [address]  Phone: [phone]  Fax: [phone]  Follow Up Time:     Adonis Carballo)  Orthopaedic Surgery  [address] [facility] [address]  [place] [address]  Phone: [phone]  Fax: [phone]  Follow Up Time:

## 2022-07-05 NOTE — DISCHARGE NOTE PROVIDER - NSDCCPCAREPLAN_GEN_ALL_CORE_FT
PRINCIPAL DISCHARGE DIAGNOSIS  Diagnosis: Cellulitis, leg  Assessment and Plan of Treatment: Improved- cleared by ID to go home withAugmentin and Doxy x 7 more days. Follow up Dr Cross in the community in 1 week

## 2022-07-05 NOTE — CONSULT NOTE ADULT - SUBJECTIVE AND OBJECTIVE BOX
HPI:  62 f with hypothyroidism, HLD, s/p R THR 5/20, started to have RLE edema and she thought she had a cut around her ankle after shaving and then started to have a few lesions there draining yellow liquid with some erythema, pain but also itching, no fever, chills  here afebrile, WBC normal  was started on doxy 7/4 and a dose of vanco today, now feels better, no erythema or tenderness on exam, just a few healing papules on R ankle, hip incision clean and healed    PAST MEDICAL & SURGICAL HISTORY:  Hip dysplasia, congenital      Spinal stenosis      Post concussion syndrome      HLD (hyperlipidemia)      Herniated intervertebral disc of lumbar spine      H/O sinus surgery  5/1997 and 5/1999      S/P hip replacement, right          Allergies    Keflex (Rash)  Levaquin (Headache)  naproxen (Other)    Intolerances        ANTIMICROBIALS:  doxycycline IVPB 100 every 12 hours      OTHER MEDS:  ascorbic acid 500 milliGRAM(s) Oral daily  aspirin  chewable 81 milliGRAM(s) Oral daily  atorvastatin 10 milliGRAM(s) Oral at bedtime  celecoxib 200 milliGRAM(s) Oral daily  cholecalciferol 2000 Unit(s) Oral daily  levothyroxine 25 MICROGram(s) Oral daily      SOCIAL HISTORY:   retired, takes care of her disabled mother  no smoking, alcohol or drug abuse  no recent travel    FAMILY HISTORY:  No recent febrile illness in family members        ROS:    All other systems negative     Constitutional: no fever, no chills, no weight loss, no night sweats  Eye: no eye pain, no redness, no vision changes  ENT:  no sore throat, no rhinorrhea  Cardiovascular:  no chest pain, no palpitation  Respiratory:  no SOB, no cough  GI:  no abd pain, no vomiting, no diarrhea  urinary: no dysuria, no hematuria, no flank pain  : no vagnal discharge or bleeding  musculoskeletal:  R hip pain after the surgery, R ankle edema and oozing lesions  skin:  no rash, just R ankle oozing and erythema which has improved  neurology:  no headache, no seizure, no change in mental status  psych: no anxiety, no depression     Physical Exam:    General:    NAD, non toxic  Head: atraumatic, normocephalic  Eyes: normal sclera and conjunctiva  ENT:   no oropharyngeal lesions, no LAD, neck supple  Cardio:    regular S1,S2, no murmur  Respiratory:   clear b/l, no wheezing  abd:   soft, BS +, not tender  :     no CVAT, no suprapubic tenderness, no sy  Musculoskeletal : no joint swelling, hip incision well healed and clean  Skin:    no rash, R ankle with a few papules, no erythema or tenderness now  vascular: no phlebitis  Neurologic:     no focal deficits  psych: normal affect      Drug Dosing Weight  Height (cm): 154.9 (04 Jul 2022 13:23)  Weight (kg): 65.8 (04 Jul 2022 13:23)  BMI (kg/m2): 27.4 (04 Jul 2022 13:23)  BSA (m2): 1.65 (04 Jul 2022 13:23)    Vital Signs Last 24 Hrs  T(F): 98 (07-05-22 @ 12:01), Max: 98 (07-05-22 @ 12:01)    Vital Signs Last 24 Hrs  HR: 80 (07-05-22 @ 12:01) (67 - 88)  BP: 106/68 (07-05-22 @ 12:01) (106/68 - 157/90)  RR: 18 (07-05-22 @ 12:01)  SpO2: 97% (07-05-22 @ 12:01) (97% - 100%)  Wt(kg): --                          11.4   4.88  )-----------( 245      ( 05 Jul 2022 06:12 )             35.4       07-05    141  |  108  |  12  ----------------------------<  93  4.0   |  23  |  0.56    Ca    8.9      05 Jul 2022 06:12    TPro  6.5  /  Alb  3.8  /  TBili  0.5  /  DBili  x   /  AST  41<H>  /  ALT  47<H>  /  AlkPhos  85  07-05          MICROBIOLOGY:  v              RADIOLOGY:    Images independently visualized and reviewed personally,  findings as below    < from: Xray Hip 2-3 Views, Right (07.04.22 @ 23:26) >    IMPRESSION:    No acute periprosthetic fracture or dislocation. No evidence of   orthopedic hardware loosening.    < end of copied text >  < from: POCUS ED Duplex Lower Ext Venous Ltd, Lt (07.04.22 @ 16:02) >  IMPRESSION:  No Evidence of proximal deep vein thrombosis in the right    lower extremity.      < end of copied text >  
62F hx R MIGUEL with Dr. Carballo 5/20/22 presents with one week history of right ankle cellulitis. Patient notes that she has been having swelling of the RLE with serous weeping since surgery. However, she notes that one week ago, she accidentally cut herself while shaving her legs; she noted a cut on the right ankle. She tried compression stockings for the edema and notes that she has been very compliant with PT, but her swelling has not improved and it is painful to wear the stockings. She began to notice increased redness and swelling of the RLE since that time. She was urged to come to the ED for further evaluation per a friend. Today in the ED, she notes that she is having burning and numbing pain throughout the right lower extremity. she notes numbness worst over the lateral thigh but also on the medial thigh. She notices mildly diminished sensation throughout the right lower extremity down to the feet that is new since surgery. Patient denies any other numbness, tingling, weakness, or any other orthopaedic complaint. Denies interval trauma.     Exam:   T(C): 36.4 (07-04-22 @ 19:30), Max: 36.6 (07-04-22 @ 15:35)  T(F): 97.6 (07-04-22 @ 19:30), Max: 97.9 (07-04-22 @ 15:35)  HR: 80 (07-04-22 @ 19:30) (70 - 96)  BP: 120/65 (07-04-22 @ 19:30) (120/65 - 157/90)  RR: 20 (07-04-22 @ 19:30) (16 - 20)  SpO2: 99% (07-04-22 @ 19:30) (96% - 100%)    Gen: resting in bed, NAD  RLE:  Healing anterior approach surgical incision, c/d/i; slight erythema and edema of the right foot up to the mid leg.   Mild-moderate TTP of the calf, ankle. Full AROM/PROM of the knee, ankle, and foot.   Paresthesias over L2-S1 diffusely, worst over the lateral thigh  DP pulse palpable.  R calf tenderness to palpation  Compartments soft and compressible.     Laboratory Results:   CBC, 07-04-22 @ 16:15    WBC: 6.99  Hgb: 11.8  Hct: 36.9  Plt: 276      Chemistry, 07-04-22 @ 16:15    Na: 140     AST: 37  K: 4.1       ALT: 44  Cl: 105      TProt: 7.3  CO2: 23     Alb: 4.3  BUN: 14     TBili: 0.4  Cr: 0.59      AP: 94  Glu: 89       Mg: --  P: --    eGFR: --  eGFR, AA: --        Imaging: doppler studies RLE negative for DVT, XR pending

## 2022-07-05 NOTE — CONSULT NOTE ADULT - ASSESSMENT
62F with R ankle cellulitis    Plan:   Abx management per ED in CDU  Obtain XR R hip, ESR, CRP  WBAT/PT/OT  Pain management PRN  DVT prophylaxis: per ED  Incentive spirometry  Dispo: pending further eval of R ankle cellulitis  Will discuss with Dr. Carballo, and will advise for any changes to the plan.   
62 f with hypothyroidism, HLD, s/p R THR 5/20, started to have RLE edema and she thought she had a cut around her ankle after shaving and then started to have a few lesions there draining yellow liquid with some erythema, pain but also itching, no fever, chills  here afebrile, WBC normal  was started on doxy 7/4 and a dose of vanco today, now feels better, no erythema or tenderness on exam, just a few healing papules on R ankle, hip incision clean and healed    ?R ankle cellulitis after a cut vs stasis dermatitis and oozing, there is no erythema or tenderness just a few papules that were the source of oozing  remote keflex allergy but no penicillin allergy and pt tolerated augmentin multiple times    * switch to augmentin 875 bid and doxy 100  bid for another 7days  * if worsening symptoms pt will return    The above assessment and plan was discussed with the primary team    Vilma Craig MD  contact on teams  After 5pm and on weekends call [phone]

## 2022-07-05 NOTE — H&P ADULT - HISTORY OF PRESENT ILLNESS
63 yo female with recent R. MIGUEL on 5/20/22 here for evaluation of R. foot/ankle swelling and erythema x 2-3 days. Pt states since her hip replacement she has been having some R. ankle swelling but cut herself shaving last week, and 2-3 days ago started to notice some erythema, pain and increased welling in the area. Pt denies fevers or chills, no sob or difficulty breathing, no numbness, tingling, weakness.

## 2022-07-05 NOTE — H&P ADULT - OPHTHALMOLOGIC
negative
The patient is a 27y Female complaining of bilateral abdominal discomfort x 3 days without nausea, vomiting, fever, bowel change.  Pt went urgent care and referred.  C/o dysuria.  IV accessed obtained, labs sent.  NPO.  NS infusing.  Will go to CT with IV contrast to r/o appendicitis.

## 2022-07-05 NOTE — DISCHARGE NOTE PROVIDER - HOSPITAL COURSE
62 f with hypothyroidism, HLD, s/p R THR 5/20, started to have RLE edema and she thought she had a cut around her ankle after shaving and then started to have a few lesions there draining yellow liquid with some erythema, pain but also itching, no fever, chills- upon admission pt was afebrile, WBC normal. ID consult appreciated -She was started on doxy 7/4 and a dose of Vanco today, now feels better, no erythema or tenderness on exam, just a few healing papules on R ankle, hip incision clean and healed  ?R ankle cellulitis after a cut vs stasis dermatitis and oozing, there is no erythema or tenderness just a few papules that were the source of oozing remote Keflex allergy but no penicillin allergy and pt tolerated Augmentin multiple times.  She was cleared by ID to be discharged to the community - IV antibiotics was switched to Augmentin 875 bid and doxy 100  bid for another 7days.  Pt advise if worsening symptoms pt will return. She will follow up with her Primary care physician Dr. Cross and her orthopedist, Dr Carballo in the community as instructed.

## 2022-07-05 NOTE — DISCHARGE NOTE NURSING/CASE MANAGEMENT/SOCIAL WORK - NSDCPEFALRISK_GEN_ALL_CORE
For information on Fall & Injury Prevention, visit: [email or web address] OR  [email or web address] OR  https://www.cdc.gov/steadi/patient.html

## 2022-07-05 NOTE — ED ADULT NURSE REASSESSMENT NOTE - NS ED NURSE REASSESS COMMENT FT1
07.00 Am Received the Pt from  [facility]. Pt is Observed for RLE cellulitis . Received the Pt A&OX 4 obeys commands Kandis N/V/D fever chills cp SOB   Comfort care & safety measures continued  IV site looks clean & dry no signs of infiltration noted pt denies  pain IV site .  Pt is advised to call for help  call bell with in the reach pt verbalized the understanding .  pending CDU  MD koch . GCS 15/15 A&OX 4 PERRLA  size 3 Strong upper & lower extremities steady gait  Redness swelling of RLE is improving   No facial droop  No Hand Leg drop denies numbness tingling Continue to monitor

## 2022-07-05 NOTE — H&P ADULT - ASSESSMENT
pt s/p r hip replacement  r lower ext edema and swelling  resolving cellulitis  po abs   id eval noted  d/c planning  ortho f/u as outpt

## 2022-07-05 NOTE — H&P ADULT - NSHPLABSRESULTS_GEN_ALL_CORE
11.4   4.88  )-----------( 245      ( 05 Jul 2022 06:12 )             35.4       07-05    141  |  108  |  12  ----------------------------<  93  4.0   |  23  |  0.56    Ca    8.9      05 Jul 2022 06:12    TPro  6.5  /  Alb  3.8  /  TBili  0.5  /  DBili  x   /  AST  41<H>  /  ALT  47<H>  /  AlkPhos  85  07-05                  PT/INR - ( 04 Jul 2022 16:15 )   PT: 11.4 sec;   INR: 0.99 ratio         PTT - ( 04 Jul 2022 16:15 )  PTT:35.1 sec    Lactate Trend            CAPILLARY BLOOD GLUCOSE

## 2022-07-05 NOTE — DISCHARGE NOTE PROVIDER - NSDCMRMEDTOKEN_GEN_ALL_CORE_FT
amoxicillin-clavulanate 875 mg-125 mg oral tablet: 875 milligram(s) orally every 12 hours   ascorbic acid 500 mg oral tablet: 1 tab(s) orally once a day  Aspirin Enteric Coated 81 mg oral delayed release tablet: 1 tab(s) orally once a day  atorvastatin 10 mg oral tablet: 1 tab(s) orally once a day  CeleBREX 200 mg oral capsule: 1 cap(s) orally once a day  cholecalciferol oral tablet: 2000 unit(s) orally once a day  CoQ10 100 mg oral capsule: 1 cap(s) orally once a day  doxycycline hyclate 100 mg oral capsule: 1 cap(s) orally 2 times a day x 7 more days  levothyroxine 25 mcg (0.025 mg) oral capsule: 1 cap(s) orally once a day   amoxicillin-clavulanate 875 mg-125 mg oral tablet: 875 milligram(s) orally every 12 hours   ascorbic acid 500 mg oral tablet: 1 tab(s) orally 2 times a day  Aspirin Enteric Coated 81 mg oral delayed release tablet: 1 tab(s) orally once a day  atorvastatin 10 mg oral tablet: 1 tab(s) orally once a day  CeleBREX 200 mg oral capsule: 1 cap(s) orally once a day  cholecalciferol oral tablet: 2000 unit(s) orally once a day  CoQ10 100 mg oral capsule: 1 cap(s) orally once a day  levothyroxine 25 mcg (0.025 mg) oral capsule: 1 tab(s) orally once a day  Tylenol 500 mg oral tablet: 2 tab(s) orally 2 times a day, As Needed   amoxicillin-clavulanate 875 mg-125 mg oral tablet: 1 tab(s) orally every 12 hours   ascorbic acid 500 mg oral tablet: 1 tab(s) orally 2 times a day  Aspirin Enteric Coated 81 mg oral delayed release tablet: 1 tab(s) orally once a day  atorvastatin 10 mg oral tablet: 1 tab(s) orally once a day  CeleBREX 200 mg oral capsule: 1 cap(s) orally once a day  cholecalciferol oral tablet: 2000 unit(s) orally once a day  CoQ10 100 mg oral capsule: 1 cap(s) orally once a day  doxycycline hyclate 100 mg oral capsule: 1 cap(s) orally 2 times a day x 7 more days  levothyroxine 25 mcg (0.025 mg) oral tablet: 1 tab(s) orally once a day   Tylenol 500 mg oral tablet: 2 tab(s) orally 2 times a day, As Needed

## 2022-07-08 ENCOUNTER — APPOINTMENT (OUTPATIENT)
Dept: ORTHOPEDIC SURGERY | Facility: CLINIC | Age: 62
End: 2022-07-08

## 2022-07-09 LAB
CULTURE RESULTS: SIGNIFICANT CHANGE UP
CULTURE RESULTS: SIGNIFICANT CHANGE UP
SPECIMEN SOURCE: SIGNIFICANT CHANGE UP
SPECIMEN SOURCE: SIGNIFICANT CHANGE UP

## 2022-08-03 ENCOUNTER — OUTPATIENT (OUTPATIENT)
Dept: OUTPATIENT SERVICES | Facility: HOSPITAL | Age: 62
LOS: 1 days | Discharge: HOME | End: 2022-08-03

## 2022-08-03 VITALS
HEIGHT: 61 IN | WEIGHT: 157.63 LBS | HEART RATE: 79 BPM | RESPIRATION RATE: 18 BRPM | SYSTOLIC BLOOD PRESSURE: 145 MMHG | OXYGEN SATURATION: 98 % | DIASTOLIC BLOOD PRESSURE: 81 MMHG | TEMPERATURE: 97 F

## 2022-08-03 DIAGNOSIS — E03.9 HYPOTHYROIDISM, UNSPECIFIED: ICD-10-CM

## 2022-08-03 DIAGNOSIS — Z01.818 ENCOUNTER FOR OTHER PREPROCEDURAL EXAMINATION: ICD-10-CM

## 2022-08-03 DIAGNOSIS — M16.12 UNILATERAL PRIMARY OSTEOARTHRITIS, LEFT HIP: ICD-10-CM

## 2022-08-03 DIAGNOSIS — E78.5 HYPERLIPIDEMIA, UNSPECIFIED: ICD-10-CM

## 2022-08-03 DIAGNOSIS — Z98.890 OTHER SPECIFIED POSTPROCEDURAL STATES: Chronic | ICD-10-CM

## 2022-08-03 DIAGNOSIS — Z96.641 PRESENCE OF RIGHT ARTIFICIAL HIP JOINT: Chronic | ICD-10-CM

## 2022-08-03 LAB
ALBUMIN SERPL ELPH-MCNC: 3.5 G/DL — SIGNIFICANT CHANGE UP (ref 3.3–5)
ALP SERPL-CCNC: 86 U/L — SIGNIFICANT CHANGE UP (ref 40–120)
ALT FLD-CCNC: 28 U/L — SIGNIFICANT CHANGE UP (ref 12–78)
ANION GAP SERPL CALC-SCNC: 4 MMOL/L — LOW (ref 5–17)
APTT BLD: 36.8 SEC — HIGH (ref 27.5–35.5)
AST SERPL-CCNC: 21 U/L — SIGNIFICANT CHANGE UP (ref 15–37)
BASOPHILS # BLD AUTO: 0.03 K/UL — SIGNIFICANT CHANGE UP (ref 0–0.2)
BASOPHILS NFR BLD AUTO: 0.5 % — SIGNIFICANT CHANGE UP (ref 0–2)
BILIRUB SERPL-MCNC: 0.5 MG/DL — SIGNIFICANT CHANGE UP (ref 0.2–1.2)
BUN SERPL-MCNC: 14 MG/DL — SIGNIFICANT CHANGE UP (ref 7–23)
CALCIUM SERPL-MCNC: 9.1 MG/DL — SIGNIFICANT CHANGE UP (ref 8.5–10.1)
CHLORIDE SERPL-SCNC: 108 MMOL/L — SIGNIFICANT CHANGE UP (ref 96–108)
CO2 SERPL-SCNC: 30 MMOL/L — SIGNIFICANT CHANGE UP (ref 22–31)
CREAT SERPL-MCNC: 0.56 MG/DL — SIGNIFICANT CHANGE UP (ref 0.5–1.3)
EGFR: 104 ML/MIN/1.73M2 — SIGNIFICANT CHANGE UP
EOSINOPHIL # BLD AUTO: 0.16 K/UL — SIGNIFICANT CHANGE UP (ref 0–0.5)
EOSINOPHIL NFR BLD AUTO: 2.6 % — SIGNIFICANT CHANGE UP (ref 0–6)
GLUCOSE SERPL-MCNC: 80 MG/DL — SIGNIFICANT CHANGE UP (ref 70–99)
HCT VFR BLD CALC: 35.7 % — SIGNIFICANT CHANGE UP (ref 34.5–45)
HGB BLD-MCNC: 11.5 G/DL — SIGNIFICANT CHANGE UP (ref 11.5–15.5)
IMM GRANULOCYTES NFR BLD AUTO: 0.3 % — SIGNIFICANT CHANGE UP (ref 0–1.5)
INR BLD: 0.96 RATIO — SIGNIFICANT CHANGE UP (ref 0.88–1.16)
LYMPHOCYTES # BLD AUTO: 2.44 K/UL — SIGNIFICANT CHANGE UP (ref 1–3.3)
LYMPHOCYTES # BLD AUTO: 39.2 % — SIGNIFICANT CHANGE UP (ref 13–44)
MCHC RBC-ENTMCNC: 28.4 PG — SIGNIFICANT CHANGE UP (ref 27–34)
MCHC RBC-ENTMCNC: 32.2 G/DL — SIGNIFICANT CHANGE UP (ref 32–36)
MCV RBC AUTO: 88.1 FL — SIGNIFICANT CHANGE UP (ref 80–100)
MONOCYTES # BLD AUTO: 0.47 K/UL — SIGNIFICANT CHANGE UP (ref 0–0.9)
MONOCYTES NFR BLD AUTO: 7.5 % — SIGNIFICANT CHANGE UP (ref 2–14)
NEUTROPHILS # BLD AUTO: 3.11 K/UL — SIGNIFICANT CHANGE UP (ref 1.8–7.4)
NEUTROPHILS NFR BLD AUTO: 49.9 % — SIGNIFICANT CHANGE UP (ref 43–77)
NRBC # BLD: 0 /100 WBCS — SIGNIFICANT CHANGE UP (ref 0–0)
PLATELET # BLD AUTO: 280 K/UL — SIGNIFICANT CHANGE UP (ref 150–400)
POTASSIUM SERPL-MCNC: 3.9 MMOL/L — SIGNIFICANT CHANGE UP (ref 3.5–5.3)
POTASSIUM SERPL-SCNC: 3.9 MMOL/L — SIGNIFICANT CHANGE UP (ref 3.5–5.3)
PROT SERPL-MCNC: 7.4 GM/DL — SIGNIFICANT CHANGE UP (ref 6–8.3)
PROTHROM AB SERPL-ACNC: 11.5 SEC — SIGNIFICANT CHANGE UP (ref 10.5–13.4)
RBC # BLD: 4.05 M/UL — SIGNIFICANT CHANGE UP (ref 3.8–5.2)
RBC # FLD: 13.4 % — SIGNIFICANT CHANGE UP (ref 10.3–14.5)
SODIUM SERPL-SCNC: 142 MMOL/L — SIGNIFICANT CHANGE UP (ref 135–145)
WBC # BLD: 6.23 K/UL — SIGNIFICANT CHANGE UP (ref 3.8–10.5)
WBC # FLD AUTO: 6.23 K/UL — SIGNIFICANT CHANGE UP (ref 3.8–10.5)

## 2022-08-03 PROCEDURE — 93971 EXTREMITY STUDY: CPT | Mod: 26,RT

## 2022-08-03 PROCEDURE — 93010 ELECTROCARDIOGRAM REPORT: CPT

## 2022-08-03 NOTE — OCCUPATIONAL THERAPY INITIAL EVALUATION ADULT - PERTINENT HX OF CURRENT PROBLEM, REHAB EVAL
L hip OA which impacts pts ability to perform functional tasks/transfers and mobility. Pt is scheduled for L THR on 08/19/22.

## 2022-08-03 NOTE — OCCUPATIONAL THERAPY INITIAL EVALUATION ADULT - ADDITIONAL COMMENTS
Pt lives with mother (Who is unable to assist. Pts brother will be around to assist post op) in a private house with 2 steps to enter with bilateral handrails that are far apart. Once inside, the pts main bedroom and bathroom is on that floor when entering. The pts bathroom has a tub/shower combination, fixed shower head, standard toilet seat and no grab bars. The pt reports that a 3/1 commode can fit over the toilet at home. Pt reports that she wants to buy her own personal commode. The pt ambulates with no device and owns a rolling walker. The pt daily pain is a 9-10/10 with rest and movement. The pt manages the pain with rest and Tylenol. The pt has no recent outpatient PT, no recent falls and has no buckling of the legs. The pt wears glasses all the time, R handed, drives and has no hearing impairments.

## 2022-08-03 NOTE — OCCUPATIONAL THERAPY INITIAL EVALUATION ADULT - NS ASR OT EQUIP NEEDS DISCH
Recommending 3/1 commode for safe transfers and ADL management. Pt reports that she wants to personally purchase her own 3/1 commode.

## 2022-08-03 NOTE — H&P PST ADULT - PROBLEM SELECTOR PLAN 4
Assessment and Plan: labs - cbc,pt/ptt,bmp,t&s,nose cx,ekg  M/C required, cardiac  preop 3 day hibiclens instruction reviewed and given .instructed on if  nose cx positive use mupuricin 5 days and checklist given  take routine meds DOS with sips of water. avoid NSAID and OTC supplements. verbalized understanding  information on proper nutrition , increase protein and better food choices provided in packet  ensure clear  patient instructed on having covid19 swab 3-5 days prior to surgery  anesthesiologist to review pst labs, ekg, medical clearances and optimization for surgery

## 2022-08-03 NOTE — H&P PST ADULT - ASSESSMENT
left hip osteoarthritis   CAPRINI SCORE    AGE RELATED RISK FACTORS                                                       MOBILITY RELATED FACTORS  [ ] Age 41-60 years                                            (1 Point)                  [ ] Bed rest                                                        (1 Point)  [x ] Age: 61-74 years                                           (2 Points)                [ ] Plaster cast                                                   (2 Points)  [ ] Age= 75 years                                              (3 Points)                 [ ] Bed bound for more than 72 hours                   (2 Points)    DISEASE RELATED RISK FACTORS                                               GENDER SPECIFIC FACTORS  x[ ] Edema in the lower extremities                       (1 Point)                  [ ] Pregnancy                                                     (1 Point)  [ ] Varicose veins                                               (1 Point)                  [ ] Post-partum < 6 weeks                                   (1 Point)             [x ] BMI > 25 Kg/m2                                            (1 Point)                  [ ] Hormonal therapy  or oral contraception            (1 Point)                 [ ] Sepsis (in the previous month)                        (1 Point)                  [ ] History of pregnancy complications  [ ] Pneumonia or serious lung disease                                               [ ] Unexplained or recurrent                       (1 Point)           (in the previous month)                               (1 Point)  [ ] Abnormal pulmonary function test                     (1 Point)                 SURGERY RELATED RISK FACTORS  [ ] Acute myocardial infarction                              (1 Point)                 [ ]  Section                                            (1 Point)  [ ] Congestive heart failure (in the previous month)  (1 Point)                 [ ] Minor surgery                                                 (1 Point)   [ ] Inflammatory bowel disease                             (1 Point)                 [ ] Arthroscopic surgery                                        (2 Points)  [ ] Central venous access                                    (2 Points)                [ ] General surgery lasting more than 45 minutes   (2 Points)       [ ] Stroke (in the previous month)                          (5 Points)               [x ] Elective arthroplasty                                        (5 Points)                                                                                                                                               HEMATOLOGY RELATED FACTORS                                                 TRAUMA RELATED RISK FACTORS  [ ] Prior episodes of VTE                                     (3 Points)                 [ ] Fracture of the hip, pelvis, or leg                       (5 Points)  [ ] Positive family history for VTE                         (3 Points)                 [ ] Acute spinal cord injury (in the previous month)  (5 Points)  [ ] Prothrombin 36659 A                                      (3 Points)                 [ ] Paralysis  (less than 1 month)                          (5 Points)  [ ] Factor V Leiden                                             (3 Points)                 [ ] Multiple Trauma within 1 month                         (5 Points)  [ ] Lupus anticoagulants                                     (3 Points)                                                           [ ] Anticardiolipin antibodies                                (3 Points)                                                       [ ] High homocysteine in the blood                      (3 Points)                                             [ ] Other congenital or acquired thrombophilia       (3 Points)                                                [ ] Heparin induced thrombocytopenia                  (3 Points)                                          Total Score [     8     ]  Caprini Score 0-2: Low risk, No VTE Prophylaxis required for most patient's, encourage ambulation  Caprini Score 3-6: At Risk, Pharmacologic VTE prophylaxis is indicated for most patients ( in the absence of a contraindication)  Caprini Score Greater than or = 7: High Risk , pharmacologic VTE is indicated for most patients ( in the absence of a contraindication)    Caprini score indicates that the patient is high risk for VTE event ( score 6 or greater). Surgical patient's in this group will benefit from both pharmacologic prophylaxis and intermittent compression devices . Surgical team will determine the balance between VTE  risk and bleeding risk and other clinical considerations

## 2022-08-03 NOTE — H&P PST ADULT - HISTORY OF PRESENT ILLNESS
62 year old female with a past medial history of hypothyroidism and HLD c/o pain and limited ROM to left  hip secondary to osteoarthritis.  She is scheduled for a left  hip arthoplasty direct anterior approach   She denies fever, cough, SOB, recent travels, and sick contacts.    Goal: to walk without pain and ride exercise bike   denies recent travels in the past 30 days. No fever, SOB, cough, flu like symptoms or body rash- covid screen

## 2022-08-04 LAB
A1C WITH ESTIMATED AVERAGE GLUCOSE RESULT: 5.7 % — HIGH (ref 4–5.6)
ESTIMATED AVERAGE GLUCOSE: 117 MG/DL — HIGH (ref 68–114)
MRSA PCR RESULT.: SIGNIFICANT CHANGE UP
S AUREUS DNA NOSE QL NAA+PROBE: SIGNIFICANT CHANGE UP
VIT D25+D1,25 OH+D1,25 PNL SERPL-MCNC: 41.8 PG/ML — SIGNIFICANT CHANGE UP (ref 19.9–79.3)

## 2022-08-17 ENCOUNTER — OUTPATIENT (OUTPATIENT)
Dept: OUTPATIENT SERVICES | Facility: HOSPITAL | Age: 62
LOS: 1 days | Discharge: HOME | End: 2022-08-17

## 2022-08-17 DIAGNOSIS — U07.1 COVID-19: ICD-10-CM

## 2022-08-17 DIAGNOSIS — Z98.890 OTHER SPECIFIED POSTPROCEDURAL STATES: Chronic | ICD-10-CM

## 2022-08-17 DIAGNOSIS — Z96.641 PRESENCE OF RIGHT ARTIFICIAL HIP JOINT: Chronic | ICD-10-CM

## 2022-08-18 ENCOUNTER — FORM ENCOUNTER (OUTPATIENT)
Age: 62
End: 2022-08-18

## 2022-08-18 ENCOUNTER — TRANSCRIPTION ENCOUNTER (OUTPATIENT)
Age: 62
End: 2022-08-18

## 2022-08-19 ENCOUNTER — TRANSCRIPTION ENCOUNTER (OUTPATIENT)
Age: 62
End: 2022-08-19

## 2022-08-19 ENCOUNTER — APPOINTMENT (OUTPATIENT)
Dept: ORTHOPEDIC SURGERY | Facility: HOSPITAL | Age: 62
End: 2022-08-19

## 2022-08-19 ENCOUNTER — INPATIENT (INPATIENT)
Facility: HOSPITAL | Age: 62
LOS: 1 days | Discharge: HOME | End: 2022-08-21
Attending: ORTHOPAEDIC SURGERY | Admitting: ORTHOPAEDIC SURGERY

## 2022-08-19 VITALS
WEIGHT: 154.98 LBS | OXYGEN SATURATION: 98 % | HEIGHT: 61 IN | DIASTOLIC BLOOD PRESSURE: 70 MMHG | SYSTOLIC BLOOD PRESSURE: 131 MMHG | RESPIRATION RATE: 18 BRPM | TEMPERATURE: 98 F | HEART RATE: 96 BPM

## 2022-08-19 DIAGNOSIS — Z96.641 PRESENCE OF RIGHT ARTIFICIAL HIP JOINT: Chronic | ICD-10-CM

## 2022-08-19 DIAGNOSIS — Z98.890 OTHER SPECIFIED POSTPROCEDURAL STATES: Chronic | ICD-10-CM

## 2022-08-19 PROCEDURE — 73502 X-RAY EXAM HIP UNI 2-3 VIEWS: CPT | Mod: 26,LT

## 2022-08-19 PROCEDURE — 27130 TOTAL HIP ARTHROPLASTY: CPT | Mod: LT

## 2022-08-19 DEVICE — HEAD DELTA CER V40 32 PLUS 4: Type: IMPLANTABLE DEVICE | Site: LEFT | Status: FUNCTIONAL

## 2022-08-19 DEVICE — LINER ACET TRIDENT X3 0 DEG 32MM D: Type: IMPLANTABLE DEVICE | Site: LEFT | Status: FUNCTIONAL

## 2022-08-19 DEVICE — STEM ACC V40 127 DEG SZ 3 30X102MM 127 DEG: Type: IMPLANTABLE DEVICE | Site: LEFT | Status: FUNCTIONAL

## 2022-08-19 DEVICE — SHELL ACET TRIDENT II D 48MM: Type: IMPLANTABLE DEVICE | Site: LEFT | Status: FUNCTIONAL

## 2022-08-19 RX ORDER — OXYCODONE HYDROCHLORIDE 5 MG/1
1 TABLET ORAL
Qty: 20 | Refills: 0
Start: 2022-08-19 | End: 2022-08-23

## 2022-08-19 RX ORDER — OXYCODONE HYDROCHLORIDE 5 MG/1
5 TABLET ORAL EVERY 4 HOURS
Refills: 0 | Status: DISCONTINUED | OUTPATIENT
Start: 2022-08-19 | End: 2022-08-21

## 2022-08-19 RX ORDER — SODIUM CHLORIDE 9 MG/ML
3 INJECTION INTRAMUSCULAR; INTRAVENOUS; SUBCUTANEOUS EVERY 8 HOURS
Refills: 0 | Status: DISCONTINUED | OUTPATIENT
Start: 2022-08-19 | End: 2022-08-19

## 2022-08-19 RX ORDER — ACETAMINOPHEN 500 MG
1000 TABLET ORAL ONCE
Refills: 0 | Status: COMPLETED | OUTPATIENT
Start: 2022-08-19 | End: 2022-08-19

## 2022-08-19 RX ORDER — POLYETHYLENE GLYCOL 3350 17 G/17G
17 POWDER, FOR SOLUTION ORAL AT BEDTIME
Refills: 0 | Status: DISCONTINUED | OUTPATIENT
Start: 2022-08-19 | End: 2022-08-21

## 2022-08-19 RX ORDER — CEFAZOLIN SODIUM 1 G
2000 VIAL (EA) INJECTION EVERY 8 HOURS
Refills: 0 | Status: COMPLETED | OUTPATIENT
Start: 2022-08-19 | End: 2022-08-20

## 2022-08-19 RX ORDER — ASCORBIC ACID 60 MG
500 TABLET,CHEWABLE ORAL
Refills: 0 | Status: DISCONTINUED | OUTPATIENT
Start: 2022-08-19 | End: 2022-08-21

## 2022-08-19 RX ORDER — UBIDECARENONE 100 MG
1 CAPSULE ORAL
Qty: 0 | Refills: 0 | DISCHARGE

## 2022-08-19 RX ORDER — ACETAMINOPHEN 500 MG
2 TABLET ORAL
Qty: 42 | Refills: 0
Start: 2022-08-19 | End: 2022-08-25

## 2022-08-19 RX ORDER — CELECOXIB 200 MG/1
200 CAPSULE ORAL EVERY 12 HOURS
Refills: 0 | Status: DISCONTINUED | OUTPATIENT
Start: 2022-08-20 | End: 2022-08-21

## 2022-08-19 RX ORDER — DEXAMETHASONE 0.5 MG/5ML
10 ELIXIR ORAL ONCE
Refills: 0 | Status: COMPLETED | OUTPATIENT
Start: 2022-08-20 | End: 2022-08-20

## 2022-08-19 RX ORDER — OXYCODONE HYDROCHLORIDE 5 MG/1
10 TABLET ORAL EVERY 4 HOURS
Refills: 0 | Status: DISCONTINUED | OUTPATIENT
Start: 2022-08-19 | End: 2022-08-21

## 2022-08-19 RX ORDER — LEVOTHYROXINE SODIUM 125 MCG
25 TABLET ORAL DAILY
Refills: 0 | Status: DISCONTINUED | OUTPATIENT
Start: 2022-08-19 | End: 2022-08-21

## 2022-08-19 RX ORDER — SODIUM CHLORIDE 9 MG/ML
500 INJECTION, SOLUTION INTRAVENOUS ONCE
Refills: 0 | Status: COMPLETED | OUTPATIENT
Start: 2022-08-19 | End: 2022-08-19

## 2022-08-19 RX ORDER — ASPIRIN/CALCIUM CARB/MAGNESIUM 324 MG
1 TABLET ORAL
Qty: 60 | Refills: 0
Start: 2022-08-19 | End: 2022-09-17

## 2022-08-19 RX ORDER — KETOROLAC TROMETHAMINE 30 MG/ML
15 SYRINGE (ML) INJECTION EVERY 6 HOURS
Refills: 0 | Status: DISCONTINUED | OUTPATIENT
Start: 2022-08-19 | End: 2022-08-20

## 2022-08-19 RX ORDER — ATORVASTATIN CALCIUM 80 MG/1
1 TABLET, FILM COATED ORAL
Qty: 0 | Refills: 0 | DISCHARGE

## 2022-08-19 RX ORDER — SODIUM CHLORIDE 9 MG/ML
500 INJECTION, SOLUTION INTRAVENOUS ONCE
Refills: 0 | Status: COMPLETED | OUTPATIENT
Start: 2022-08-19 | End: 2022-08-20

## 2022-08-19 RX ORDER — ONDANSETRON 8 MG/1
4 TABLET, FILM COATED ORAL EVERY 6 HOURS
Refills: 0 | Status: DISCONTINUED | OUTPATIENT
Start: 2022-08-19 | End: 2022-08-21

## 2022-08-19 RX ORDER — ACETAMINOPHEN 500 MG
2 TABLET ORAL
Qty: 0 | Refills: 0 | DISCHARGE

## 2022-08-19 RX ORDER — TRAMADOL HYDROCHLORIDE 50 MG/1
1 TABLET ORAL
Qty: 40 | Refills: 0
Start: 2022-08-19 | End: 2022-08-28

## 2022-08-19 RX ORDER — VANCOMYCIN HCL 1 G
1000 VIAL (EA) INTRAVENOUS ONCE
Refills: 0 | Status: COMPLETED | OUTPATIENT
Start: 2022-08-19 | End: 2022-08-19

## 2022-08-19 RX ORDER — SODIUM CHLORIDE 9 MG/ML
1000 INJECTION, SOLUTION INTRAVENOUS
Refills: 0 | Status: DISCONTINUED | OUTPATIENT
Start: 2022-08-19 | End: 2022-08-19

## 2022-08-19 RX ORDER — ATORVASTATIN CALCIUM 80 MG/1
10 TABLET, FILM COATED ORAL AT BEDTIME
Refills: 0 | Status: DISCONTINUED | OUTPATIENT
Start: 2022-08-19 | End: 2022-08-21

## 2022-08-19 RX ORDER — TRAMADOL HYDROCHLORIDE 50 MG/1
50 TABLET ORAL EVERY 6 HOURS
Refills: 0 | Status: DISCONTINUED | OUTPATIENT
Start: 2022-08-19 | End: 2022-08-21

## 2022-08-19 RX ORDER — ONDANSETRON 8 MG/1
4 TABLET, FILM COATED ORAL ONCE
Refills: 0 | Status: DISCONTINUED | OUTPATIENT
Start: 2022-08-19 | End: 2022-08-19

## 2022-08-19 RX ORDER — ACETAMINOPHEN 500 MG
1000 TABLET ORAL ONCE
Refills: 0 | Status: COMPLETED | OUTPATIENT
Start: 2022-08-20 | End: 2022-08-20

## 2022-08-19 RX ORDER — PANTOPRAZOLE SODIUM 20 MG/1
40 TABLET, DELAYED RELEASE ORAL
Refills: 0 | Status: DISCONTINUED | OUTPATIENT
Start: 2022-08-19 | End: 2022-08-21

## 2022-08-19 RX ORDER — OMEPRAZOLE 10 MG/1
1 CAPSULE, DELAYED RELEASE ORAL
Qty: 31 | Refills: 0
Start: 2022-08-19 | End: 2022-09-18

## 2022-08-19 RX ORDER — SENNA PLUS 8.6 MG/1
2 TABLET ORAL AT BEDTIME
Refills: 0 | Status: DISCONTINUED | OUTPATIENT
Start: 2022-08-19 | End: 2022-08-21

## 2022-08-19 RX ORDER — ASPIRIN/CALCIUM CARB/MAGNESIUM 324 MG
81 TABLET ORAL
Refills: 0 | Status: DISCONTINUED | OUTPATIENT
Start: 2022-08-19 | End: 2022-08-21

## 2022-08-19 RX ORDER — ACETAMINOPHEN 500 MG
650 TABLET ORAL ONCE
Refills: 0 | Status: COMPLETED | OUTPATIENT
Start: 2022-08-19 | End: 2022-08-19

## 2022-08-19 RX ADMIN — SODIUM CHLORIDE 500 MILLILITER(S): 9 INJECTION, SOLUTION INTRAVENOUS at 16:44

## 2022-08-19 RX ADMIN — Medication 400 MILLIGRAM(S): at 16:44

## 2022-08-19 RX ADMIN — TRAMADOL HYDROCHLORIDE 50 MILLIGRAM(S): 50 TABLET ORAL at 23:47

## 2022-08-19 RX ADMIN — Medication 500 MILLIGRAM(S): at 18:16

## 2022-08-19 RX ADMIN — Medication 100 MILLIGRAM(S): at 19:40

## 2022-08-19 RX ADMIN — SODIUM CHLORIDE 500 MILLILITER(S): 9 INJECTION, SOLUTION INTRAVENOUS at 13:11

## 2022-08-19 RX ADMIN — TRAMADOL HYDROCHLORIDE 50 MILLIGRAM(S): 50 TABLET ORAL at 22:47

## 2022-08-19 RX ADMIN — SODIUM CHLORIDE 3 MILLILITER(S): 9 INJECTION INTRAMUSCULAR; INTRAVENOUS; SUBCUTANEOUS at 09:44

## 2022-08-19 RX ADMIN — POLYETHYLENE GLYCOL 3350 17 GRAM(S): 17 POWDER, FOR SOLUTION ORAL at 21:50

## 2022-08-19 RX ADMIN — ATORVASTATIN CALCIUM 10 MILLIGRAM(S): 80 TABLET, FILM COATED ORAL at 21:50

## 2022-08-19 RX ADMIN — Medication 650 MILLIGRAM(S): at 09:40

## 2022-08-19 RX ADMIN — Medication 81 MILLIGRAM(S): at 18:16

## 2022-08-19 RX ADMIN — SENNA PLUS 2 TABLET(S): 8.6 TABLET ORAL at 21:50

## 2022-08-19 RX ADMIN — ONDANSETRON 4 MILLIGRAM(S): 8 TABLET, FILM COATED ORAL at 22:47

## 2022-08-19 RX ADMIN — Medication 250 MILLIGRAM(S): at 09:40

## 2022-08-19 RX ADMIN — SODIUM CHLORIDE 100 MILLILITER(S): 9 INJECTION, SOLUTION INTRAVENOUS at 13:11

## 2022-08-19 RX ADMIN — Medication 15 MILLIGRAM(S): at 18:16

## 2022-08-19 NOTE — PROGRESS NOTE ADULT - SUBJECTIVE AND OBJECTIVE BOX
Orthopedics Post-op Check:    This is a 63y/o Female s/p Left anterior Total Hip Arthroplasty POD 0.  Pain is controlled. Pt feeling well. No nausea or vomiting.     Vital Signs Last 24 Hrs  T(C): 37.1 (08-19-22 @ 15:24), Max: 37.1 (08-19-22 @ 15:24)  T(F): 98.7 (08-19-22 @ 15:24), Max: 98.7 (08-19-22 @ 15:24)  HR: 84 (08-19-22 @ 15:24) (65 - 96)  BP: 121/78 (08-19-22 @ 15:24) (105/66 - 136/78)  BP(mean): --  RR: 18 (08-19-22 @ 15:24) (13 - 18)  SpO2: 96% (08-19-22 @ 15:24) (95% - 98%)      Exam:  NAD AAOx3.    LLE  Dressing clean, dry and intact.  SCDs in place.  Calves are soft and nontender.  Moving all toes and ankle, +EHL/FHL/TA/GS.  SILT throughout.  DP and PT pulses 2+.    A/P:  62F Stable POD 0 sp Left anterior Total Hip Arthroplasty  -Analgesia  -Ice application  -DVT PE ppx  -Incentive spirometry  -OOB PT WBAT LLE with hip precautions  -Prophylactic abx x24hr  -Dispo pending PT recs

## 2022-08-19 NOTE — DISCHARGE NOTE PROVIDER - NSDCFUADDINST_GEN_ALL_CORE_FT
Please see Dr. Carballo's packet for detailed post-operative care instructions.  1.	Pain Control: Tylenol, OxyIR, Ultram as needed for pain.  2.	Walking with full weight bearing as tolerated, with assistive devices (walker/Cane as Needed)  3.	DVT Prophylaxis- Aspirin 81mg oral twice daily for 30 days  4.	Follow up with Dr. Carballo as Outpatient in 30 Days after Discharge from the Hospital. Call Office For Appointment.   5.	Follow exercise program given to you by Dr. Carballo.   6.	Ice affected area as needed  7.	Keep Dressing Clean and dry. Do not remove until post operative day written on bandage.    Please see Dr. Carballo's packet for detailed post-operative care instructions.  1.	Pain Control: a prescription for pain medications was sent to your pharmacy. Take Tylenol, Naprosyn, Oxycodone, Tramadol (Ultram) as needed for pain.   2.	Walking with full weight bearing as tolerated, with assistive devices (walker/cane) as needed.  3.	Blood clot prevention: Take Asprin 81mg twice per day, by mouth, for 30 days.  4.	Take Omeprazole to prevent stomach irritation and/or ulcers.   5.	Follow up with Dr. Carballo as an outpatient 2 weeks after discharge from the Hospital. Call Office For Appointment.   6.	Follow exercise program given to you by Dr. Carballo.   7.	Ice affected area as needed.  8.	Keep Dressing Clean and dry. Do not remove dressing until the date specified on your dressing. If no date was specified, leave the dressing on until your follow up office visit with Dr. Carballo.   9.    For any further questions, please refer to your instruction packet from Dr. Carballo which was given to you at the time of your hospital discharge.     Please see Dr. Carballo's packet for detailed post-operative care instructions.  1.	Pain Control: a prescription for pain medications was sent to your pharmacy. Take Tylenol, Tramadol (Ultram) for pain. Tramadol (Ultram) you send you had a prescription at home and we did not prescribe additional Tramadol (Ultram). After a discussion of risks and benefits, you asked to not be prescribed Naproxsyn due to bleeding risk and Oxycodone due to nausea and preference to limit narcotics.   2.	Walking with full weight bearing as tolerated, with assistive devices (walker/cane) as needed.  3.	Blood clot prevention: Take Asprin 81mg twice per day, by mouth, for 30 days.  4.	Take Omeprazole to prevent stomach irritation and/or ulcers.   5.	Follow up with Dr. Carballo as an outpatient 2 weeks after discharge from the Hospital. Call Office For Appointment.   6.	Follow exercise program given to you by Dr. Carballo.   7.	Ice affected area as needed.  8.	Keep Dressing Clean and dry. Do not remove dressing until the date specified on your dressing. If no date was specified, leave the dressing on until your follow up office visit with Dr. Carballo.   9.    For any further questions, please refer to your instruction packet from Dr. Carballo which was given to you at the time of your hospital discharge.     Please see Dr. Carballo's packet for detailed post-operative care instructions.  1.	Pain Control: a prescription for pain medications was sent to your pharmacy. Take Tylenol, Napraoxsyn, Tramadol (Ultram) for pain. After a discussion of risks and benefits, you asked to not be prescribed Oxycodone due to nausea and preference to limit narcotics.   2.	Walking with full weight bearing as tolerated, with assistive devices (walker/cane) as needed.  3.	Blood clot prevention: Take Asprin 81mg twice per day, by mouth, for 30 days.  4.	Take Omeprazole to prevent stomach irritation and/or ulcers.   5.	Follow up with Dr. Carballo as an outpatient 2 weeks after discharge from the Hospital. Call Office For Appointment.   6.	Follow exercise program given to you by Dr. Carballo.   7.	Ice affected area as needed.  8.	Keep Dressing Clean and dry. Do not remove dressing until the date specified on your dressing. If no date was specified, leave the dressing on until your follow up office visit with Dr. Carballo.   9.    For any further questions, please refer to your instruction packet from Dr. Carballo which was given to you at the time of your hospital discharge.

## 2022-08-19 NOTE — OCCUPATIONAL THERAPY INITIAL EVALUATION ADULT - NSOTDMEREC_GEN_A_CORE
Patient has rolling walker and cane, currently using her mother's 3:1 commode. Patient stated that she will purchase her own 3:1 commode

## 2022-08-19 NOTE — PHYSICAL THERAPY INITIAL EVALUATION ADULT - NSPTDMEREC_GEN_A_CORE
Pt states she has a rolling walker, cane and old 3:1 commode. Pt states she will purchase a new 3:1 commode privately.

## 2022-08-19 NOTE — DISCHARGE NOTE PROVIDER - NSDCMRMEDTOKEN_GEN_ALL_CORE_FT
acetaminophen 500 mg oral capsule: 2 cap(s) orally every 8 hours MDD:6  ascorbic acid 500 mg oral tablet: 1 tab(s) orally 2 times a day  Aspirin Enteric Coated 81 mg oral delayed release tablet: 1 tab(s) orally 2 times a day MDD:2  atorvastatin 10 mg oral tablet: 1 tab(s) orally once a day  cholecalciferol oral tablet: 2000 unit(s) orally once a day  CoQ10 100 mg oral capsule: 1 cap(s) orally once a day  levothyroxine 25 mcg (0.025 mg) oral tablet: 1 tab(s) orally once a day   naproxen 500 mg oral tablet: 1 tab(s) orally every 12 hours MDD:2  omeprazole 40 mg oral delayed release capsule: 1 cap(s) orally once a day MDD:1  oxyCODONE 5 mg oral capsule: 1 cap(s) orally every 6 hours MDD:4  Ultram 50 mg oral tablet: 1 tab(s) orally every 6 hours MDD:4   3 in 1 commode : 3 in 1 commode   R26.2   S/p MIGUEL  acetaminophen 500 mg oral capsule: 2 cap(s) orally every 8 hours MDD:6  ascorbic acid 500 mg oral tablet: 1 tab(s) orally 2 times a day  Aspirin Enteric Coated 81 mg oral delayed release tablet: 1 tab(s) orally 2 times a day MDD:2  atorvastatin 10 mg oral tablet: 1 tab(s) orally once a day  cholecalciferol oral tablet: 2000 unit(s) orally once a day  CoQ10 100 mg oral capsule: 1 cap(s) orally once a day  levothyroxine 25 mcg (0.025 mg) oral tablet: 1 tab(s) orally once a day   naproxen 500 mg oral tablet: 1 tab(s) orally every 12 hours MDD:2  omeprazole 40 mg oral delayed release capsule: 1 cap(s) orally once a day MDD:1  oxyCODONE 5 mg oral capsule: 1 cap(s) orally every 6 hours MDD:4  Ultram 50 mg oral tablet: 1 tab(s) orally every 6 hours MDD:4   acetaminophen 500 mg oral capsule: 2 cap(s) orally every 8 hours MDD:6  ascorbic acid 500 mg oral tablet: 1 tab(s) orally 2 times a day  Aspirin Enteric Coated 81 mg oral delayed release tablet: 1 tab(s) orally 2 times a day MDD:2  atorvastatin 10 mg oral tablet: 1 tab(s) orally once a day  cholecalciferol oral tablet: 2000 unit(s) orally once a day  CoQ10 100 mg oral capsule: 1 cap(s) orally once a day  levothyroxine 25 mcg (0.025 mg) oral tablet: 1 tab(s) orally once a day   omeprazole 40 mg oral delayed release capsule: 1 cap(s) orally once a day MDD:1

## 2022-08-19 NOTE — OCCUPATIONAL THERAPY INITIAL EVALUATION ADULT - GROSSLY INTACT, SENSORY
Impression: Age-related nuclear cataract, bilateral: H25.13. Plan: Continue to monitor without treatment at this time. Grossly Intact

## 2022-08-19 NOTE — PHYSICAL THERAPY INITIAL EVALUATION ADULT - IMPAIRMENTS FOUND, PT EVAL
aerobic capacity/endurance/ergonomics and body mechanics/gait, locomotion, and balance/joint integrity and mobility/muscle strength

## 2022-08-19 NOTE — PHYSICAL THERAPY INITIAL EVALUATION ADULT - STRENGTHENING, PT EVAL
Patient will improve strength by 1 grade in L hip to improve overall functional mobility including gait, transfers, bed mobility and decrease risk of falls.

## 2022-08-19 NOTE — OCCUPATIONAL THERAPY INITIAL EVALUATION ADULT - GENERAL OBSERVATIONS, REHAB EVAL
Chart reviewed and event noted to date. Patient encountered supine in bed, NAD, A&Ox4, WBAT LLE s/p L MIGUEL, +dressing to L knee C/D/I, +SCD's, +primafit, +IV heplock. PT [place] bedside. Patient educated on L anterior hip precaution.

## 2022-08-19 NOTE — PHYSICAL THERAPY INITIAL EVALUATION ADULT - ADDITIONAL COMMENTS
Preo info confirmed. Pt lives with mother (Who is unable to assist. Pts brother will be around to assist post op) in a private house with 2 steps to enter with bilateral handrails that are far apart. Once inside, the pts main bedroom and bathroom is on that floor when entering. The pts bathroom has a tub/shower combination, fixed shower head, standard toilet seat and no grab bars. The pt reports a 3/1 commode can fit over the toilet at home has been using her mothers old commode in working condition. Pt reports that she wants to buy her own personal commode. The pt ambulates with no device and owns a rolling walker and cane. The pt daily pain is a 9-10/10 with rest and movement. The pt manages the pain with rest and Tylenol. The pt has no recent outpatient PT, no recent falls and has no buckling of the legs. The pt wears glasses all the time, R handed, drives and has no hearing impairments.

## 2022-08-19 NOTE — OCCUPATIONAL THERAPY INITIAL EVALUATION ADULT - ADDITIONAL COMMENTS
Pre-confirmed: Pt lives with mother (Who is unable to assist. Pts brother will be around to assist post op) in a private house with 2 steps to enter with bilateral handrails that are far apart. Once inside, the pts main bedroom and bathroom is on that floor when entering. The pts bathroom has a tub/shower combination, fixed shower head, standard toilet seat and no grab bars. The pt reports that a 3/1 commode can fit over the toilet at home. Pt reports that she wants to buy her own personal commode. The pt ambulates with no device and owns a rolling walker.  The pt wears glasses all the time, R handed, drives and has no hearing impairments.

## 2022-08-19 NOTE — PATIENT PROFILE ADULT - FALL HARM RISK - UNIVERSAL INTERVENTIONS
Bed in lowest position, wheels locked, appropriate side rails in place/Call bell, personal items and telephone in reach/Instruct patient to call for assistance before getting out of bed or chair/Non-slip footwear when patient is out of bed/[place] to call system/Physically safe environment - no spills, clutter or unnecessary equipment/Purposeful Proactive Rounding/Room/bathroom lighting operational, light cord in reach

## 2022-08-19 NOTE — DISCHARGE NOTE PROVIDER - CARE PROVIDER_API CALL
Adonis Carballo)  Orthopaedic Surgery  [address], [address]  [place] [address]  Phone: [phone]  Fax: [phone]  Established Patient  Follow Up Time:

## 2022-08-19 NOTE — DISCHARGE NOTE PROVIDER - HOSPITAL COURSE
The patient is a 62 year old female status post elective total hip arthroplasty to the LEFT hip after failing outpatient nonoperative conservative management. Patient presented to [facility] after being medically cleared for an elective surgical procedure. The patient was taken to the operating room. Prophylactic antibiotics were started before the procedure and continued for 24 hours. There were no complications during the procedure and patient tolerated the procedure well. The patient was transferred to the recovery room in stable condition and subsequently to the surgical floor. The patient was placed on Aspirin 81 for anticoagulation. All home medications were continued. The patient received physical therapy daily and daily labs were followed. The dressing was kept clean, dry, intact. Up to post operative day 2, the hospital stay was unremarkable.

## 2022-08-19 NOTE — DISCHARGE NOTE PROVIDER - NSDCCPTREATMENT_GEN_ALL_CORE_FT
PRINCIPAL PROCEDURE  Procedure: Arthroplasty of left hip by anterior approach  Findings and Treatment:

## 2022-08-19 NOTE — PHYSICAL THERAPY INITIAL EVALUATION ADULT - ACTIVE RANGE OF MOTION EXAMINATION, REHAB EVAL
except L knee extension limited due to numbness, L hip WFL within anterior hip precautions/bilateral upper extremity Active ROM was WFL (within functional limits)/bilateral  lower extremity Active ROM was WFL (within functional limits)

## 2022-08-19 NOTE — DISCHARGE NOTE PROVIDER - NSDCFUSCHEDAPPT_GEN_ALL_CORE_FT
[facility] Physician [facility]  ORTHOSURG [address]  Scheduled Appointment: 09/02/2022

## 2022-08-20 ENCOUNTER — TRANSCRIPTION ENCOUNTER (OUTPATIENT)
Age: 62
End: 2022-08-20

## 2022-08-20 LAB
ANION GAP SERPL CALC-SCNC: 5 MMOL/L — SIGNIFICANT CHANGE UP (ref 5–17)
BUN SERPL-MCNC: 14 MG/DL — SIGNIFICANT CHANGE UP (ref 7–23)
CALCIUM SERPL-MCNC: 7.8 MG/DL — LOW (ref 8.5–10.1)
CHLORIDE SERPL-SCNC: 110 MMOL/L — HIGH (ref 96–108)
CO2 SERPL-SCNC: 26 MMOL/L — SIGNIFICANT CHANGE UP (ref 22–31)
CREAT SERPL-MCNC: 0.67 MG/DL — SIGNIFICANT CHANGE UP (ref 0.5–1.3)
EGFR: 99 ML/MIN/1.73M2 — SIGNIFICANT CHANGE UP
GLUCOSE SERPL-MCNC: 100 MG/DL — HIGH (ref 70–99)
HCT VFR BLD CALC: 28.5 % — LOW (ref 34.5–45)
HGB BLD-MCNC: 9.2 G/DL — LOW (ref 11.5–15.5)
MCHC RBC-ENTMCNC: 28.3 PG — SIGNIFICANT CHANGE UP (ref 27–34)
MCHC RBC-ENTMCNC: 32.3 G/DL — SIGNIFICANT CHANGE UP (ref 32–36)
MCV RBC AUTO: 87.7 FL — SIGNIFICANT CHANGE UP (ref 80–100)
NRBC # BLD: 0 /100 WBCS — SIGNIFICANT CHANGE UP (ref 0–0)
PLATELET # BLD AUTO: 226 K/UL — SIGNIFICANT CHANGE UP (ref 150–400)
POTASSIUM SERPL-MCNC: 4.3 MMOL/L — SIGNIFICANT CHANGE UP (ref 3.5–5.3)
POTASSIUM SERPL-SCNC: 4.3 MMOL/L — SIGNIFICANT CHANGE UP (ref 3.5–5.3)
RBC # BLD: 3.25 M/UL — LOW (ref 3.8–5.2)
RBC # FLD: 13.4 % — SIGNIFICANT CHANGE UP (ref 10.3–14.5)
SODIUM SERPL-SCNC: 141 MMOL/L — SIGNIFICANT CHANGE UP (ref 135–145)
WBC # BLD: 8.85 K/UL — SIGNIFICANT CHANGE UP (ref 3.8–10.5)
WBC # FLD AUTO: 8.85 K/UL — SIGNIFICANT CHANGE UP (ref 3.8–10.5)

## 2022-08-20 RX ORDER — TRAMADOL HYDROCHLORIDE 50 MG/1
50 TABLET ORAL ONCE
Refills: 0 | Status: DISCONTINUED | OUTPATIENT
Start: 2022-08-20 | End: 2022-08-21

## 2022-08-20 RX ORDER — ACETAMINOPHEN 500 MG
1000 TABLET ORAL ONCE
Refills: 0 | Status: COMPLETED | OUTPATIENT
Start: 2022-08-20 | End: 2022-08-20

## 2022-08-20 RX ORDER — ACETAMINOPHEN 500 MG
1000 TABLET ORAL ONCE
Refills: 0 | Status: COMPLETED | OUTPATIENT
Start: 2022-08-20 | End: 2023-07-19

## 2022-08-20 RX ORDER — CHOLECALCIFEROL (VITAMIN D3) 125 MCG
2000 CAPSULE ORAL DAILY
Refills: 0 | Status: DISCONTINUED | OUTPATIENT
Start: 2022-08-20 | End: 2022-08-21

## 2022-08-20 RX ADMIN — Medication 1 TABLET(S): at 12:52

## 2022-08-20 RX ADMIN — Medication 400 MILLIGRAM(S): at 06:06

## 2022-08-20 RX ADMIN — Medication 500 MILLIGRAM(S): at 06:06

## 2022-08-20 RX ADMIN — Medication 81 MILLIGRAM(S): at 06:06

## 2022-08-20 RX ADMIN — Medication 81 MILLIGRAM(S): at 17:03

## 2022-08-20 RX ADMIN — Medication 500 MILLIGRAM(S): at 17:03

## 2022-08-20 RX ADMIN — Medication 15 MILLIGRAM(S): at 12:52

## 2022-08-20 RX ADMIN — Medication 1000 MILLIGRAM(S): at 16:00

## 2022-08-20 RX ADMIN — Medication 15 MILLIGRAM(S): at 06:21

## 2022-08-20 RX ADMIN — Medication 400 MILLIGRAM(S): at 23:55

## 2022-08-20 RX ADMIN — Medication 100 MILLIGRAM(S): at 04:19

## 2022-08-20 RX ADMIN — ATORVASTATIN CALCIUM 10 MILLIGRAM(S): 80 TABLET, FILM COATED ORAL at 22:18

## 2022-08-20 RX ADMIN — Medication 400 MILLIGRAM(S): at 15:06

## 2022-08-20 RX ADMIN — Medication 15 MILLIGRAM(S): at 13:30

## 2022-08-20 RX ADMIN — Medication 102 MILLIGRAM(S): at 06:06

## 2022-08-20 RX ADMIN — Medication 2000 UNIT(S): at 17:03

## 2022-08-20 RX ADMIN — Medication 1000 MILLIGRAM(S): at 06:21

## 2022-08-20 RX ADMIN — POLYETHYLENE GLYCOL 3350 17 GRAM(S): 17 POWDER, FOR SOLUTION ORAL at 22:18

## 2022-08-20 RX ADMIN — SODIUM CHLORIDE 500 MILLILITER(S): 9 INJECTION, SOLUTION INTRAVENOUS at 06:08

## 2022-08-20 RX ADMIN — Medication 25 MICROGRAM(S): at 06:06

## 2022-08-20 RX ADMIN — SENNA PLUS 2 TABLET(S): 8.6 TABLET ORAL at 22:18

## 2022-08-20 RX ADMIN — Medication 15 MILLIGRAM(S): at 06:07

## 2022-08-20 RX ADMIN — PANTOPRAZOLE SODIUM 40 MILLIGRAM(S): 20 TABLET, DELAYED RELEASE ORAL at 06:06

## 2022-08-20 NOTE — DISCHARGE NOTE NURSING/CASE MANAGEMENT/SOCIAL WORK - PATIENT PORTAL LINK FT
You can access the FollowMyHealth Patient Portal offered by [facility] by registering at the following website: [email or web address]. By joining [email or web address] FollowMyHealth portal, you will also be able to view your health information using other applications (apps) compatible with our system.

## 2022-08-20 NOTE — PROGRESS NOTE ADULT - SUBJECTIVE AND OBJECTIVE BOX
Examined patient at bedside at approximately 22:30, 02:30, 06:30 for patient complaint of L anterior thigh swelling. Anterior thigh compartment soft, compressible. Patient counseled. Subjective "swelling" patient believes has improved this AM 2/2 ice application.     Patient seen and examined at bedside this AM. Pain well controlled with medication. Patient denies any numbness, tingling, weakness, or any other orthopaedic complaint. Denies N/V/CP/SOB. Patient states she would "feel more comfortable not leaving hospital until Sunday."    LABS:                        9.2    8.85  )-----------( 226      ( 20 Aug 2022 05:40 )             28.5     08-20    141  |  110<H>  |  14  ----------------------------<  100<H>  4.3   |  26  |  0.67    Ca    7.8<L>      20 Aug 2022 05:40    VITAL SIGNS:  T(C): 36.5 (08-20-22 @ 04:34), Max: 37.6 (08-19-22 @ 16:27)  HR: 75 (08-20-22 @ 04:34) (65 - 96)  BP: 130/77 (08-20-22 @ 04:34) (100/63 - 136/78)  RR: 16 (08-20-22 @ 04:34) (13 - 18)  SpO2: 97% (08-20-22 @ 04:34) (93% - 98%)  Exam:  NAD AAOx3.    LLE  Dressing clean, dry and intact.  SCDs in place.  Calves are soft and nontender.  +EHL/FHL/TA/GS.  SILT L4-S1  +DP     A/P:  62F Stable POD 1 s/p Left anterior Total Hip Arthroplasty  -Analgesia  -Ice application  -DVT PE ppx, ASA 81 BID  -Incentive spirometry  -OOB PT WBAT LLE with hip precautions  -Prophylactic abx x24hr  -Dispo Home  -Ortho stable for discharge       Patient seen and examined at bedside this AM. Pain well controlled with medication. Patient denies any numbness, tingling, weakness, or any other orthopaedic complaint. Denies N/V/CP/SOB. Patient states she would "feel more comfortable not leaving hospital until Sunday". Examined patient at bedside at approximately 22:30, 02:30, 06:30 for patient complaint of L anterior thigh swelling. Anterior thigh compartment soft, compressible. Patient counseled. Subjective "swelling" patient believes has improved this AM 2/2 ice application.     LABS:                        9.2    8.85  )-----------( 226      ( 20 Aug 2022 05:40 )             28.5     08-20    141  |  110<H>  |  14  ----------------------------<  100<H>  4.3   |  26  |  0.67    Ca    7.8<L>      20 Aug 2022 05:40    VITAL SIGNS:  T(C): 36.5 (08-20-22 @ 04:34), Max: 37.6 (08-19-22 @ 16:27)  HR: 75 (08-20-22 @ 04:34) (65 - 96)  BP: 130/77 (08-20-22 @ 04:34) (100/63 - 136/78)  RR: 16 (08-20-22 @ 04:34) (13 - 18)  SpO2: 97% (08-20-22 @ 04:34) (93% - 98%)  Exam:  NAD AAOx3.    LLE  Dressing clean, dry and intact.  SCDs in place.  Calves are soft and nontender.  +EHL/FHL/TA/GS.  SILT L4-S1  +DP     A/P:  62F Stable POD 1 s/p Left anterior Total Hip Arthroplasty  -Analgesia  -Ice application  -DVT PE ppx, ASA 81 BID  -Incentive spirometry  -OOB PT WBAT LLE with hip precautions  -Prophylactic abx x24hr  -Dispo Home, likely 8/21  -Ortho stable for discharge

## 2022-08-20 NOTE — DISCHARGE NOTE NURSING/CASE MANAGEMENT/SOCIAL WORK - NSDCPEFALRISK_GEN_ALL_CORE
For information on Fall & Injury Prevention, visit: [email or web address] OR  [email or web address] OR  https://www.cdc.gov/steadi/patient.html

## 2022-08-21 VITALS
TEMPERATURE: 99 F | HEART RATE: 83 BPM | DIASTOLIC BLOOD PRESSURE: 67 MMHG | SYSTOLIC BLOOD PRESSURE: 134 MMHG | RESPIRATION RATE: 15 BRPM | OXYGEN SATURATION: 98 %

## 2022-08-21 LAB
ANION GAP SERPL CALC-SCNC: 2 MMOL/L — LOW (ref 5–17)
BUN SERPL-MCNC: 11 MG/DL — SIGNIFICANT CHANGE UP (ref 7–23)
CALCIUM SERPL-MCNC: 8.7 MG/DL — SIGNIFICANT CHANGE UP (ref 8.5–10.1)
CHLORIDE SERPL-SCNC: 111 MMOL/L — HIGH (ref 96–108)
CO2 SERPL-SCNC: 30 MMOL/L — SIGNIFICANT CHANGE UP (ref 22–31)
CREAT SERPL-MCNC: 0.53 MG/DL — SIGNIFICANT CHANGE UP (ref 0.5–1.3)
EGFR: 105 ML/MIN/1.73M2 — SIGNIFICANT CHANGE UP
GLUCOSE SERPL-MCNC: 87 MG/DL — SIGNIFICANT CHANGE UP (ref 70–99)
HCT VFR BLD CALC: 32.4 % — LOW (ref 34.5–45)
HGB BLD-MCNC: 10.5 G/DL — LOW (ref 11.5–15.5)
MCHC RBC-ENTMCNC: 28.6 PG — SIGNIFICANT CHANGE UP (ref 27–34)
MCHC RBC-ENTMCNC: 32.4 G/DL — SIGNIFICANT CHANGE UP (ref 32–36)
MCV RBC AUTO: 88.3 FL — SIGNIFICANT CHANGE UP (ref 80–100)
NRBC # BLD: 0 /100 WBCS — SIGNIFICANT CHANGE UP (ref 0–0)
PLATELET # BLD AUTO: 242 K/UL — SIGNIFICANT CHANGE UP (ref 150–400)
POTASSIUM SERPL-MCNC: 3.7 MMOL/L — SIGNIFICANT CHANGE UP (ref 3.5–5.3)
POTASSIUM SERPL-SCNC: 3.7 MMOL/L — SIGNIFICANT CHANGE UP (ref 3.5–5.3)
RBC # BLD: 3.67 M/UL — LOW (ref 3.8–5.2)
RBC # FLD: 13.8 % — SIGNIFICANT CHANGE UP (ref 10.3–14.5)
SODIUM SERPL-SCNC: 143 MMOL/L — SIGNIFICANT CHANGE UP (ref 135–145)
WBC # BLD: 11.05 K/UL — HIGH (ref 3.8–10.5)
WBC # FLD AUTO: 11.05 K/UL — HIGH (ref 3.8–10.5)

## 2022-08-21 RX ORDER — ACETAMINOPHEN 500 MG
1000 TABLET ORAL ONCE
Refills: 0 | Status: COMPLETED | OUTPATIENT
Start: 2022-08-21 | End: 2022-08-21

## 2022-08-21 RX ADMIN — Medication 1000 MILLIGRAM(S): at 00:30

## 2022-08-21 RX ADMIN — CELECOXIB 200 MILLIGRAM(S): 200 CAPSULE ORAL at 06:45

## 2022-08-21 RX ADMIN — Medication 500 MILLIGRAM(S): at 05:45

## 2022-08-21 RX ADMIN — PANTOPRAZOLE SODIUM 40 MILLIGRAM(S): 20 TABLET, DELAYED RELEASE ORAL at 05:47

## 2022-08-21 RX ADMIN — CELECOXIB 200 MILLIGRAM(S): 200 CAPSULE ORAL at 05:45

## 2022-08-21 RX ADMIN — Medication 1000 MILLIGRAM(S): at 11:00

## 2022-08-21 RX ADMIN — Medication 81 MILLIGRAM(S): at 05:47

## 2022-08-21 RX ADMIN — Medication 400 MILLIGRAM(S): at 09:53

## 2022-08-21 RX ADMIN — Medication 2000 UNIT(S): at 11:46

## 2022-08-21 RX ADMIN — Medication 1 TABLET(S): at 11:46

## 2022-08-21 RX ADMIN — Medication 25 MICROGRAM(S): at 05:46

## 2022-08-21 NOTE — PROGRESS NOTE ADULT - SUBJECTIVE AND OBJECTIVE BOX
Patient seen and examined at bedside this AM. Pain well controlled with medication. Patient denies any numbness, tingling, weakness, or any other orthopaedic complaint. Denies N/V/CP/SOB.       LABS:                        9.2    8.85  )-----------( 226      ( 20 Aug 2022 05:40 )             28.5     08-20    141  |  110<H>  |  14  ----------------------------<  100<H>  4.3   |  26  |  0.67    Ca    7.8<L>      20 Aug 2022 05:40            VITAL SIGNS:  T(C): 36.7 (08-21-22 @ 05:07), Max: 36.7 (08-21-22 @ 05:07)  HR: 85 (08-21-22 @ 05:07) (71 - 91)  BP: 125/73 (08-21-22 @ 05:07) (111/66 - 134/77)  RR: 16 (08-21-22 @ 05:07) (16 - 16)  SpO2: 96% (08-21-22 @ 05:07) (95% - 99%)    Exam:  NAD AAOx3.    LLE  Dressing clean, dry and intact.  SCDs in place.  Calves are soft and nontender.  +EHL/FHL/TA/GS.  SILT L4-S1  +DP     A/P:  62F Stable POD 2 s/p Left anterior Total Hip Arthroplasty    -Analgesia prn  -Ice application  -DVT PE ppx, ASA 81 BID  -Incentive spirometry  -Pt requesting No NSAIDs, tramadol or oxy on discharge per patient request   -OOB PT WBAT LLE with ant hip precautions  -Prophylactic abx x24hr  -Dispo Home 8/21  -Ortho stable for discharge

## 2022-08-22 ENCOUNTER — NON-APPOINTMENT (OUTPATIENT)
Age: 62
End: 2022-08-22

## 2022-08-22 RX ORDER — CELECOXIB 100 MG/1
100 CAPSULE ORAL
Qty: 60 | Refills: 2 | Status: ACTIVE | COMMUNITY
Start: 2022-08-22 | End: 1900-01-01

## 2022-08-23 DIAGNOSIS — M48.00 SPINAL STENOSIS, SITE UNSPECIFIED: ICD-10-CM

## 2022-08-23 DIAGNOSIS — E78.5 HYPERLIPIDEMIA, UNSPECIFIED: ICD-10-CM

## 2022-08-23 DIAGNOSIS — M51.26 OTHER INTERVERTEBRAL DISC DISPLACEMENT, LUMBAR REGION: ICD-10-CM

## 2022-08-23 DIAGNOSIS — E03.9 HYPOTHYROIDISM, UNSPECIFIED: ICD-10-CM

## 2022-08-23 DIAGNOSIS — M16.12 UNILATERAL PRIMARY OSTEOARTHRITIS, LEFT HIP: ICD-10-CM

## 2022-08-23 DIAGNOSIS — Q65.89 OTHER SPECIFIED CONGENITAL DEFORMITIES OF HIP: ICD-10-CM

## 2022-09-02 ENCOUNTER — APPOINTMENT (OUTPATIENT)
Dept: ORTHOPEDIC SURGERY | Facility: CLINIC | Age: 62
End: 2022-09-02

## 2022-09-02 PROBLEM — E03.9 HYPOTHYROIDISM, UNSPECIFIED: Chronic | Status: ACTIVE | Noted: 2022-08-19

## 2022-09-02 PROCEDURE — 99024 POSTOP FOLLOW-UP VISIT: CPT

## 2022-09-02 PROCEDURE — 73502 X-RAY EXAM HIP UNI 2-3 VIEWS: CPT | Mod: 26,LT

## 2022-09-02 NOTE — HISTORY OF PRESENT ILLNESS
[de-identified] : Status-post left total hip  arthroplasty here for initial postoperative evaluation. Excellent progress is noted in terms of pain and restoration of function. Pain is well controlled with oral medications. There has been no change in medical health since discharge. The patient does require assistive devices.

## 2022-09-02 NOTE — PHYSICAL EXAM
[de-identified] : Well developed, well nourished in no apparent distress, awake, alert and orientated to person, place and time with appropriate mood and affect\par Respirations are even and unlabored. Gait evaluation does not reveal a limp. There is no inguinal adenopathy. The affected limb is well-perfused with palpable pedal pulse, without skin lesions, shows a grossly normal motor and sensory examination. Incision is healed Hip motion is full and painless throughout ROM. Leg lengths are approximately equal  [de-identified] : AP pelvis, AP hip, and lateral x-rays of the left hip were ordered and obtained in the office and demonstrate satisfactory position and alignment of the components are present. No signs of loosening are seen.

## 2022-09-02 NOTE — DISCUSSION/SUMMARY
[de-identified] : The patient is doing well after joint replacement surgery. Written infectious precautions were reviewed. The patient will progress with physical therapy at this time and they will work on transitioning from requiring assistive devices for ambulation. Anti-coagulant therapy will be discontinued at 1 month post surgery for the purpose of orthopedic thromboembolism prophylaxis. Return around the 6 week anniversary from surgery for follow-up evaluation.

## 2022-09-05 ENCOUNTER — EMERGENCY (EMERGENCY)
Facility: HOSPITAL | Age: 62
LOS: 1 days | Discharge: HOME | End: 2022-09-05
Attending: EMERGENCY MEDICINE
Payer: MEDICARE

## 2022-09-05 VITALS
RESPIRATION RATE: 18 BRPM | TEMPERATURE: 98 F | DIASTOLIC BLOOD PRESSURE: 80 MMHG | OXYGEN SATURATION: 98 % | SYSTOLIC BLOOD PRESSURE: 125 MMHG | HEIGHT: 61 IN | WEIGHT: 149.91 LBS | HEART RATE: 87 BPM

## 2022-09-05 VITALS
SYSTOLIC BLOOD PRESSURE: 134 MMHG | HEART RATE: 80 BPM | DIASTOLIC BLOOD PRESSURE: 68 MMHG | RESPIRATION RATE: 16 BRPM | OXYGEN SATURATION: 100 %

## 2022-09-05 DIAGNOSIS — Z98.890 OTHER SPECIFIED POSTPROCEDURAL STATES: Chronic | ICD-10-CM

## 2022-09-05 DIAGNOSIS — Z96.641 PRESENCE OF RIGHT ARTIFICIAL HIP JOINT: Chronic | ICD-10-CM

## 2022-09-05 LAB
ALBUMIN SERPL ELPH-MCNC: 4 G/DL — SIGNIFICANT CHANGE UP (ref 3.3–5)
ALP SERPL-CCNC: 126 U/L — HIGH (ref 40–120)
ALT FLD-CCNC: 45 U/L — SIGNIFICANT CHANGE UP (ref 10–45)
ANION GAP SERPL CALC-SCNC: 12 MMOL/L — SIGNIFICANT CHANGE UP (ref 5–17)
AST SERPL-CCNC: 40 U/L — SIGNIFICANT CHANGE UP (ref 10–40)
BILIRUB SERPL-MCNC: 0.4 MG/DL — SIGNIFICANT CHANGE UP (ref 0.2–1.2)
BUN SERPL-MCNC: 13 MG/DL — SIGNIFICANT CHANGE UP (ref 7–23)
CALCIUM SERPL-MCNC: 9.6 MG/DL — SIGNIFICANT CHANGE UP (ref 8.4–10.5)
CHLORIDE SERPL-SCNC: 105 MMOL/L — SIGNIFICANT CHANGE UP (ref 96–108)
CO2 SERPL-SCNC: 24 MMOL/L — SIGNIFICANT CHANGE UP (ref 22–31)
CREAT SERPL-MCNC: 0.55 MG/DL — SIGNIFICANT CHANGE UP (ref 0.5–1.3)
EGFR: 104 ML/MIN/1.73M2 — SIGNIFICANT CHANGE UP
FLUAV AG NPH QL: SIGNIFICANT CHANGE UP
FLUBV AG NPH QL: SIGNIFICANT CHANGE UP
GLUCOSE SERPL-MCNC: 92 MG/DL — SIGNIFICANT CHANGE UP (ref 70–99)
HCT VFR BLD CALC: 33.7 % — LOW (ref 34.5–45)
HGB BLD-MCNC: 10.4 G/DL — LOW (ref 11.5–15.5)
MCHC RBC-ENTMCNC: 27.2 PG — SIGNIFICANT CHANGE UP (ref 27–34)
MCHC RBC-ENTMCNC: 30.9 GM/DL — LOW (ref 32–36)
MCV RBC AUTO: 88.2 FL — SIGNIFICANT CHANGE UP (ref 80–100)
NRBC # BLD: 0 /100 WBCS — SIGNIFICANT CHANGE UP (ref 0–0)
PLATELET # BLD AUTO: 348 K/UL — SIGNIFICANT CHANGE UP (ref 150–400)
POTASSIUM SERPL-MCNC: 4 MMOL/L — SIGNIFICANT CHANGE UP (ref 3.5–5.3)
POTASSIUM SERPL-SCNC: 4 MMOL/L — SIGNIFICANT CHANGE UP (ref 3.5–5.3)
PROT SERPL-MCNC: 7.5 G/DL — SIGNIFICANT CHANGE UP (ref 6–8.3)
RBC # BLD: 3.82 M/UL — SIGNIFICANT CHANGE UP (ref 3.8–5.2)
RBC # FLD: 13.9 % — SIGNIFICANT CHANGE UP (ref 10.3–14.5)
RSV RNA NPH QL NAA+NON-PROBE: SIGNIFICANT CHANGE UP
SARS-COV-2 RNA SPEC QL NAA+PROBE: SIGNIFICANT CHANGE UP
SODIUM SERPL-SCNC: 141 MMOL/L — SIGNIFICANT CHANGE UP (ref 135–145)
WBC # BLD: 7.49 K/UL — SIGNIFICANT CHANGE UP (ref 3.8–10.5)
WBC # FLD AUTO: 7.49 K/UL — SIGNIFICANT CHANGE UP (ref 3.8–10.5)

## 2022-09-05 PROCEDURE — 99284 EMERGENCY DEPT VISIT MOD MDM: CPT | Mod: 25

## 2022-09-05 PROCEDURE — 99284 EMERGENCY DEPT VISIT MOD MDM: CPT

## 2022-09-05 PROCEDURE — 86140 C-REACTIVE PROTEIN: CPT

## 2022-09-05 PROCEDURE — 87637 SARSCOV2&INF A&B&RSV AMP PRB: CPT

## 2022-09-05 PROCEDURE — 96374 THER/PROPH/DIAG INJ IV PUSH: CPT

## 2022-09-05 PROCEDURE — 73502 X-RAY EXAM HIP UNI 2-3 VIEWS: CPT | Mod: 26,LT

## 2022-09-05 PROCEDURE — 85652 RBC SED RATE AUTOMATED: CPT

## 2022-09-05 PROCEDURE — 87040 BLOOD CULTURE FOR BACTERIA: CPT

## 2022-09-05 PROCEDURE — 36415 COLL VENOUS BLD VENIPUNCTURE: CPT

## 2022-09-05 PROCEDURE — 85027 COMPLETE CBC AUTOMATED: CPT

## 2022-09-05 PROCEDURE — 73502 X-RAY EXAM HIP UNI 2-3 VIEWS: CPT

## 2022-09-05 PROCEDURE — 80053 COMPREHEN METABOLIC PANEL: CPT

## 2022-09-05 RX ORDER — KETOROLAC TROMETHAMINE 30 MG/ML
15 SYRINGE (ML) INJECTION ONCE
Refills: 0 | Status: DISCONTINUED | OUTPATIENT
Start: 2022-09-05 | End: 2022-09-05

## 2022-09-05 RX ADMIN — Medication 15 MILLIGRAM(S): at 17:17

## 2022-09-05 NOTE — ED PROVIDER NOTE - NSICDXPASTMEDICALHX_GEN_ALL_CORE_FT
PAST MEDICAL HISTORY:  Herniated intervertebral disc of lumbar spine     Hip dysplasia, congenital     HLD (hyperlipidemia)     Hypothyroid     Post concussion syndrome     Spinal stenosis

## 2022-09-05 NOTE — ED ADULT NURSE NOTE - NSIMPLEMENTINTERV_GEN_ALL_ED
Implemented All Fall Risk Interventions:  [place] to call system. Call bell, personal items and telephone within reach. Instruct patient to call for assistance. Room bathroom lighting operational. Non-slip footwear when patient is off stretcher. Physically safe environment: no spills, clutter or unnecessary equipment. Stretcher in lowest position, wheels locked, appropriate side rails in place. Provide visual cue, wrist band, yellow gown, etc. Monitor gait and stability. Monitor for mental status changes and reorient to person, place, and time. Review medications for side effects contributing to fall risk. Reinforce activity limits and safety measures with patient and family.

## 2022-09-05 NOTE — ED ADULT NURSE REASSESSMENT NOTE - NS ED NURSE REASSESS COMMENT FT1
Report received from ONDINA Han. Pt AAOx4,  resp nonlabored, skin warm/dry, resting comfortably in bed. Pt denies any pain at this time. Pt states she needs to be discharged, regardless medical care plan. Pt awaiting for a dispo. Comfort care & safety measures continued  IV site looks clean & dry no signs of infiltration noted.

## 2022-09-05 NOTE — ED PROVIDER NOTE - PHYSICAL EXAMINATION
CONSTITUTIONAL: NAD  HEAD: NCAT  EYES: NL inspection  ENT: MMM; b/l tympanic membranes intact light reflux no clear effusion or purulence, nasal turbinates not erythematous ; no maxillary sinus tenderness   NECK: Supple; non tender.  CARD: RRR  RESP: CTAB  ABD: S/NT no R/G  EXT: L 1+ pitting pedal edema  SKIN: 10" incision over L hip mildly erythematous but no dehiscence, no drainage at time, unable to express any pus. No focal areas of fluctuance, mildly tender, mild redness of thigh ; intact DP, intact sensation.   NEURO: Grossly unremarkable  PSYCH: Cooperative, appropriate.

## 2022-09-05 NOTE — ED PROVIDER NOTE - ATTENDING CONTRIBUTION TO CARE
61 yo female s/p recent L hip arthroplasty sent in by visiting nurse 2/2 concern for infxn.  on exam, inflammatory changes directly at incision site do not appear infectious.  mild surrounding erythema/warmth without fluctuance.  patient not systemically ill-appearing.  ortho consult and dispo per their recc's.

## 2022-09-05 NOTE — ED PROVIDER NOTE - NS ED ROS FT
Constitutional:  See HPI, afebrile   Eyes:  No visual changes  ENMT: No neck pain or stiffness; +L ear discomfort + sinus congestion with nasal discharge  Cardiac:  No chest pain  Respiratory:  No cough or respiratory distress.   GI:  No nausea, vomiting, diarrhea or abdominal pain.  Skin/MS:  +L hip incision wound with yellowish crusting  Neuro:  No headache   Except as documented in the HPI,  all other systems are negative

## 2022-09-05 NOTE — ED CLERICAL - NS ED CLERK NOTE PRE-ARRIVAL INFORMATION; ADDITIONAL PRE-ARRIVAL INFORMATION
Chief Complaint   Patient presents with   • Results     Lab FV        HISTORY OF PRESENT ILLNESS: Patient is a 60 y.o. female established patient who presents today to discuss the following issues:    Hyperlipidemia  Reviewed recent lab results.  Triglycerides have gone up but LDL has gone done.  Discussed diet and lifestyle changes.      BMI 30.0-30.9,adult  Patient is aware of BMI elevation.  Brief discussion of diet, exercise, and lifestyle modification.        Patient Active Problem List    Diagnosis Date Noted   • BMI 30.0-30.9,adult 2019   • Hyperlipidemia 2019   • Breast cancer screening 08/15/2019   • Need for vaccination 08/15/2019   • Dysthymia 2018   • Nicotine vapor product user 2016   • Chronic back pain greater than 3 months duration 2015   • Hypokalemia 2013   • Hypertension 04/15/2013       Allergies:Patient has no known allergies.    Current Outpatient Medications   Medication Sig Dispense Refill   • IRON-B12-VITAMINS PO Take  by mouth.     • Cholecalciferol (VITAMIN D3 PO) Take  by mouth.     • HYDROcodone-acetaminophen (NORCO) 7.5-325 MG per tablet Take 1 Tab by mouth every 8 hours as needed.  0   • amLODIPine (NORVASC) 5 MG Tab Take 1 Tab by mouth every day. 90 Tab 3   • lisinopril (PRINIVIL) 20 MG Tab Take 1 Tab by mouth every day. 90 Tab 3   • Misc. Devices Misc Hot tub For alleviating pain and Arthritis discpmfprt 1 Each 0   • ibuprofen (MOTRIN) 400 MG Tab Take 400 mg by mouth every 6 hours as needed.     • acetaminophen (TYLENOL) 500 MG Tab Take 500-1,000 mg by mouth every 6 hours as needed.     • Multiple Vitamins-Minerals (ONE-A-DAY 50 PLUS PO) Take  by mouth every day.       No current facility-administered medications for this visit.        Social History     Tobacco Use   • Smoking status: Former Smoker     Packs/day: 1.00     Years: 31.00     Pack years: 31.00     Types: Cigarettes     Last attempt to quit: 2015     Years since quittin.1   •  This patient is enrolled in the comprehensive joint replacement (CJR) program and has active care navigation.  This patient can be followed up by the care navigation team within 24 hours. To arrange close follow-up or to obtain additional clinical information about this patient, please call the care navigator contact number above. Please page the Orthopedic Resident ([phone]) or Ortho PA at [phone] for input and/or consultation PRIOR to admission. For patients previously on the Hospitalist Service please page the Admitting Hospitalist as listed or the Covering Hospitalist at [phone] for input and/or consultation PRIOR to admission. If the patient was previously on a Voluntary Physician’s service please contact that physician for input and/or consultation PRIOR to admission. "Smokeless tobacco: Never Used   Substance Use Topics   • Alcohol use: Yes     Alcohol/week: 2.4 oz     Types: 4 Cans of beer per week   • Drug use: No       Family Status   Relation Name Status   • Mo     • Fa     • Sis  Alive   • Neg Hx  (Not Specified)     Family History   Problem Relation Age of Onset   • Hyperlipidemia Mother    • Heart Disease Mother    • Diabetes Mother    • Thyroid Mother    • Hyperlipidemia Father    • Cancer Father         leukemia due to chemical exposure   • Thyroid Sister    • Stroke Neg Hx        Review of Systems:   Constitutional: Negative for fever, chills, weight loss and malaise/fatigue.   HENT: Negative for ear pain, nosebleeds, congestion, sore throat and neck pain.    Eyes: Negative for blurred vision.   Respiratory: Negative for cough, sputum production, shortness of breath and wheezing.    Cardiovascular: Negative for chest pain, palpitations, orthopnea and leg swelling.   Gastrointestinal: Negative for heartburn, nausea, vomiting and abdominal pain.   Genitourinary: Negative for dysuria, urgency and frequency.   Musculoskeletal: Negative for myalgias, joint pain, and back pain.  Skin: Negative for rash and itching.   Neurological: Negative for dizziness, tingling, tremors, sensory change, focal weakness and headaches.   Endo/Heme/Allergies: Does not bruise/bleed easily.   Psychiatric/Behavioral: Negative for depression, suicidal ideas and memory loss.  The patient is not nervous/anxious and does not have insomnia.    All other systems reviewed and are negative except as in HPI.    Exam:  Blood Pressure 128/80   Pulse 74   Temperature 36.4 °C (97.6 °F)   Respiration 16   Height 1.676 m (5' 6\")   Weight 85.3 kg (188 lb)   Oxygen Saturation 97%   General:  Well nourished, well developed female in NAD  Head: Grossly normal.  Neck: Supple without JVD or bruit. Thyroid is not enlarged.  Pulmonary: Clear to ausculation. Normal effort. No rales, ronchi, or " wheezing.  Cardiovascular: Regular rate and rhythm without murmur.   Abdomen:  Soft, nontender, nondistended.  Extremities: No clubbing, cyanosis, or edema.  Skin: Intact with no obvious rashes or lesions.  Neuro: Grossly intact.  Psych: Alert and oriented x 3.  Mood and affect appropriate.    Medical decision-making and discussion: Jayashree is here today for follow-up.  Her lab results were reviewed, medications were discussed, and she will follow-up here as needed.          Assessment/Plan:  1. BMI 30.0-30.9,adult  Patient identified as having weight management issue.  Appropriate orders and counseling given.   2. Hyperlipidemia, unspecified hyperlipidemia type         Return if symptoms worsen or fail to improve.    Please note that this dictation was created using voice recognition software. I have made every reasonable attempt to correct obvious errors, but I expect that there are errors of grammar and possibly content that I did not discover before finalizing the note.

## 2022-09-05 NOTE — ED PROVIDER NOTE - PATIENT PORTAL LINK FT
You can access the FollowMyHealth Patient Portal offered by [facility] by registering at the following website: [email or web address]. By joining [employer]’s FollowMyHealth portal, you will also be able to view your health information using other applications (apps) compatible with our system.

## 2022-09-05 NOTE — ED PROVIDER NOTE - OBJECTIVE STATEMENT
61yo F with PMH hypothyroidism, HLD presents to ED for L hip incision wound check . Had L hip replaced on Aug 19th with Dr Carballo at [place]. On 9/2 she removed the dressing at home noted some 'yellowish' crusting at top of incision. It has continued to have some crusting which has made her increasingly anxious. Mildly erythematous, no inc pain or dehiscence of wounds, fevers. No CP, SOB, abd pain, NVDC. Also complaints of some sinus complaints and ear pain.

## 2022-09-05 NOTE — ED PROVIDER NOTE - NSFOLLOWUPINSTRUCTIONS_ED_ALL_ED_FT
Skin Care for Wound    - Keep area clean and dry  - Can put bactrim along it and cover it with a clean gauze  - Monitor it for worsening pain, spreading redness, increasing purulence - seek medical evaluation.    Follow up with your Othopedic surgeon in his office on discharge.    Ear and Sinus Symptoms  - Please follow up with primary care provider on discharge  - If you have worsening discharge, increasing ear pain, high fevers, or headaches - seek medical evaluation.    Rest, drink plenty of fluids.  Advance activity as tolerated.  Continue all previously prescribed medications as directed.  Follow up with your primary care physician in 48-72 hours- bring copies of your results.  Return to the ER for worsening or persistent symptoms, and/or ANY NEW OR CONCERNING SYMPTOMS.

## 2022-09-05 NOTE — ED ADULT NURSE NOTE - OBJECTIVE STATEMENT
Pt 62 year old female, A/O x4. Pt came in due to possible infection of surgical site. As pet pt, on 8/19 she had left total hip replacement. On 8/30 she removed dressing and noticed yellow drainage, redness, swelling, and tender and hot to touch. Upon entry to ED, pt ambulating independent, well appearing, no signs of distress, complaining of chills. Skin- warm, dry, surgical site on left hip. Abd. soft, nontender, nondistended. Denies chest pain, fever, N/V/D, HA, numbness/ tingling.

## 2022-09-05 NOTE — ED PROVIDER NOTE - CLINICAL SUMMARY MEDICAL DECISION MAKING FREE TEXT BOX
[place] PGY2: 61yo F with hypothyroidism presents for wound check of L hip incision after replacement Aug 19th. Wound appears to be healing well, small area of redness at top but no clear expression of pus or areas of fluctuance. Check labs, ortho-surg consult. Likely dc home.

## 2022-09-05 NOTE — CONSULT NOTE ADULT - SUBJECTIVE AND OBJECTIVE BOX
Orthopaedic Surgery Consult Note    HPI:  62F s/p L anterior approach MIGUEL 8/19 with Dr. Carballo presents after being sent in by [facility] to rule out SSI. Patient notes that for the past several days she has had clear-yellow crusting drainage and irritation at the superficial aspect of her incision. She was seen in the office for routine follow up on 9/2 and notes no changes in incision since that time. Denies pain, fever, chills, numbness, weakness, paresthesias. Has been taking doxycycline for an URI.     PAST MEDICAL & SURGICAL HISTORY:  Hip dysplasia, congenital      Spinal stenosis      Post concussion syndrome      HLD (hyperlipidemia)      Herniated intervertebral disc of lumbar spine      Hypothyroid      H/O sinus surgery  5/1997 and 5/1999      S/P hip replacement, right      MEDICATIONS  (STANDING):    MEDICATIONS  (PRN):    Allergies    Keflex (Rash)  Levaquin (Headache)  naproxen (Other)    Intolerances        Vital Signs Last 24 Hrs  T(C): 36.8 (05 Sep 2022 14:51), Max: 36.8 (05 Sep 2022 14:51)  T(F): 98.3 (05 Sep 2022 14:51), Max: 98.3 (05 Sep 2022 14:51)  HR: 80 (05 Sep 2022 19:55) (80 - 87)  BP: 134/68 (05 Sep 2022 19:55) (125/80 - 134/68)  BP(mean): --  RR: 16 (05 Sep 2022 19:55) (16 - 18)  SpO2: 100% (05 Sep 2022 19:55) (98% - 100%)    Parameters below as of 05 Sep 2022 19:55  Patient On (Oxygen Delivery Method): room air          PHYSICAL EXAM:  NAD  LLE:  well healing surgical incision with some scabbing at proximal portion of incisions, mild erythema at proximal wound edges not extending into surrounding tissue  no surrounding redness, warmth, or induration  no edema  5/5 EHL/FHL/TA/Gastrocnemius  no pain with gentle him AROM  SILT DP/SP/S/S/T nerve distributions  Compartments soft and compressible  No calf TTP  2+ Dorsalis Pedis pulse                           10.4   7.49  )-----------( 348      ( 05 Sep 2022 17:16 )             33.7     09-05    141  |  105  |  13  ----------------------------<  92  4.0   |  24  |  0.55    Ca    9.6      05 Sep 2022 17:16    TPro  7.5  /  Alb  4.0  /  TBili  0.4  /  DBili  x   /  AST  40  /  ALT  45  /  AlkPhos  126<H>  09-05    CRP 4      IMAGING STUDIES:  XR L hip with stable positioning of implants, no periprosthetic fracture or dislocation    ASSESSMENT AND PLAN  62y Female s/p L anterior MIGUEL 8/19 with routine healing, some mild irritation at proximal portion of wound    - bacitracin to wound daily, cover with dry sterile dressing (gauze, paper tape)  - continue DVT ppx as prescribed by Dr. Carballo  - WBAT, anterior dislocation precautions, PT  - FU with Dr. Carballo as previously scheduled, call [phone] for appt.    For all questions related to patient care, please reach out to the on-call team via the pager.     Linda Curiel PGY-3  Orthopaedic Surgery  [facility] [phone]  [facility] [phone]  [facility] [phone]     Discussed with attending orthopaedic surgeon on call,    
Orthopaedic Surgery Consult Note    HPI:  48F s/p C4-7 ACDF 8/26/22 with Dr. Richard presented to the ED today with atraumatic R foot pain and swelling that started when she woke up this morning. Able to ambulate, no fracture/dislocation. Orthopaedic surgery called when patient mentioned that she has started to have drainage from her surgical site. She describes it as clear-yellow, odorless. Denies fever, chills, increase in neck/arm pain. Stable LUE paresthesias from postop. Denies weakness.    PAST MEDICAL & SURGICAL HISTORY:  Hip dysplasia, congenital      Spinal stenosis      Post concussion syndrome      HLD (hyperlipidemia)      Herniated intervertebral disc of lumbar spine      Hypothyroid      H/O sinus surgery  5/1997 and 5/1999      S/P hip replacement, right    MEDICATIONS  (STANDING):    MEDICATIONS  (PRN):    Allergies    Keflex (Rash)  Levaquin (Headache)  naproxen (Other)    Intolerances        Vital Signs Last 24 Hrs  T(C): 36.8 (05 Sep 2022 14:51), Max: 36.8 (05 Sep 2022 14:51)  T(F): 98.3 (05 Sep 2022 14:51), Max: 98.3 (05 Sep 2022 14:51)  HR: 80 (05 Sep 2022 19:55) (80 - 87)  BP: 134/68 (05 Sep 2022 19:55) (125/80 - 134/68)  BP(mean): --  RR: 16 (05 Sep 2022 19:55) (16 - 18)  SpO2: 100% (05 Sep 2022 19:55) (98% - 100%)    Parameters below as of 05 Sep 2022 19:55  Patient On (Oxygen Delivery Method): room air          PHYSICAL EXAM:  NAD  Back:   Aspen collar in place, well healing anterior surgical incision and drain site without erythema, induration, expressible drainage or palpable collection  RUE:  Delt 5/5 Bi 5/5 Tri 5/5 Wrist ext 5/5  5/5  SILT C5-T1  2+ radial pulse  Negative Harrington  LUE:  Delt 5/5 Bi 5/5 Tri 5/5 Wrist ext 5/5  4/5  SILT C5-T1, paresthesias throughout  2+ radial pulse  Negative Harrington                          10.4   7.49  )-----------( 348      ( 05 Sep 2022 17:16 )             33.7     09-05    141  |  105  |  13  ----------------------------<  92  4.0   |  24  |  0.55    Ca    9.6      05 Sep 2022 17:16    TPro  7.5  /  Alb  4.0  /  TBili  0.4  /  DBili  x   /  AST  40  /  ALT  45  /  AlkPhos  126<H>  09-05          IMAGING STUDIES:  N/A    ASSESSMENT AND PLAN  62y Female s/p C4-7 ACDF with well-healing surgical incision    - Aspen collar when out of bed  - No twisting, bending, heavy lifting  - Pain control PRN  - FU with Dr. Richard as previously scheduled, call [phone] for appt.    For all questions related to patient care, please reach out to the on-call team via the pager.     Linda Curiel PGY-3  Orthopaedic Surgery  [facility] [phone]  [facility] [phone]  [facility] [phone]

## 2022-09-06 LAB — ERYTHROCYTE [SEDIMENTATION RATE] IN BLOOD: 67 MM/HR — HIGH (ref 0–20)

## 2022-09-06 RX ORDER — MUPIROCIN 20 MG/G
1 OINTMENT TOPICAL
Qty: 50 | Refills: 0
Start: 2022-09-06 | End: 2022-09-10

## 2022-09-06 NOTE — ED POST DISCHARGE NOTE - DETAILS
9/6/22: spoke with pt and explained elevated esr. pt was seen in ED for superficial erythema/yellow crusting over wound site s/p hip replacement. pt reports previous elevated harvey, being worked up for SLE/other rheum conditions. pt reports wound site redness improved but still red w/yellow crusting discharge. currently applying topical bacitracin. pt currently on doxy for uri sx. offered mupirocen to broaden topical manisha coverage. pt was given strict followup instructions and return precautions

## 2022-09-08 ENCOUNTER — APPOINTMENT (OUTPATIENT)
Dept: ORTHOPEDIC SURGERY | Facility: CLINIC | Age: 62
End: 2022-09-08

## 2022-09-08 VITALS — WEIGHT: 143 LBS | BODY MASS INDEX: 27 KG/M2 | HEIGHT: 61 IN

## 2022-09-08 DIAGNOSIS — M22.2X1 PATELLOFEMORAL DISORDERS, RIGHT KNEE: ICD-10-CM

## 2022-09-08 DIAGNOSIS — M22.2X2 PATELLOFEMORAL DISORDERS, RIGHT KNEE: ICD-10-CM

## 2022-09-08 PROCEDURE — 73564 X-RAY EXAM KNEE 4 OR MORE: CPT | Mod: 50

## 2022-09-08 PROCEDURE — 99215 OFFICE O/P EST HI 40 MIN: CPT | Mod: 24

## 2022-09-08 NOTE — HISTORY OF PRESENT ILLNESS
[de-identified] : This is very nice 62-year-old female experiencing bilateral knee pain, which is moderate in intensity. The pain moderately limits activities of daily living.  This is a new problem for her.  Walking tolerance is reduced.  This is limiting her recovery from her left hip replacement.  Celebrex is helping.  Denies any catching clicking locking or giving way.  The pain actually started before the hip replacement but she never told me about it and now she is see me for this pain.  Also status-post left total hip arthroplasty and states that she went to ED over weekend at visiting nurse instruction because the nurse felt it was infected. Excellent progress is noted in terms of pain and restoration of function in terms of the left hip.  No pain in the left hip. The patient does not require assistive devices.  No fevers or chills.

## 2022-09-08 NOTE — PHYSICAL EXAM
[de-identified] : Well developed, well nourished in no apparent distress, awake, alert and orientated to person, place and time with appropriate mood and affect\par Respirations are even and unlabored. Gait evaluation does not reveal a limp. There is no inguinal adenopathy. The bilateral limbs are well-perfused with palpable pedal pulse, without skin lesions, shows a grossly normal motor and sensory examination.  Left hip incision is healed except for the proximal part which has mild minimal erythema without fluctuance and no exudate.  Hip motion is full and painless throughout ROM. Leg lengths are approximately equal \par  Right knee motion is painless and the knee moves from 0 to 135 degrees. The knee is stable within that range-of-motion to AP and ML stress with a 1A Lachman, negative anterior or posterior drawer and no instability to varus or valgus stress. The alignment of the knee is 5 degrees varus. No effusion or crepitus is noted. No tenderness to palpation about the medial or lateral joint line, medial or lateral tibial plateau, medial or lateral femoral condyle, medial or lateral patellar facets, superior or inferior pole of the patella. Trista's is negative. Muscle strength is normal. Pedal pulses are palpable. Hip examination was negative.  Left knee motion is painless and the knee moves from 0 to 135 degrees. The knee is stable within that range-of-motion to AP and ML stress with a 1A Lachman, negative anterior or posterior drawer and no instability to varus or valgus stress. The alignment of the knee is 5 degrees varus. No effusion or crepitus is noted. No tenderness to palpation about the medial or lateral joint line, medial or lateral tibial plateau, medial or lateral femoral condyle, medial or lateral patellar facets, superior or inferior pole of the patella. Trista's is negative. Muscle strength is normal. Pedal pulses are palpable. Hip examination was negative.  Mildly positive left more than right patellar grind. [de-identified] : Long standing knee, AP knee, lateral knee, and patellar views of the bilateral knee were ordered and taken in the office and demonstrate no evidence of degenerative joint disease of the knee, fractures, intra-articular pathology.

## 2022-09-08 NOTE — DISCUSSION/SUMMARY
[de-identified] : This patient has bilateral knee patellofemoral syndrome left more than right.  This could be secondary to recovery from her bilateral hip replacements or could be separate from this as she had a prior existing problem.  The patient is not an appropriate candidate for surgical intervention at this time. An extensive discussion was conducted on the natural history of the disease and the variety of surgical and non-surgical options available to the patient including, but not limited to non-steroidal anti-inflammatory medications, steroid injections, physical therapy, maintenance of ideal body weight, and reduction of activity.  I prescribed Celebrex but she already has a prescription for this at home so she will continue to take this.  She would do home exercise program for this at her preference.  She does not do formal physical therapy now.  I also suggested using a neoprene sleeve knee brace.\par In terms of the left hip she is recovering well.  No evidence of infection.  Reassurance provided.  If signs and symptoms of infection discussed with the patient.  She is already planning a follow-up with me in 1 month for her next postoperative visit for the left hip.  Several questions sought and answered.

## 2022-09-11 LAB
CULTURE RESULTS: SIGNIFICANT CHANGE UP
SPECIMEN SOURCE: SIGNIFICANT CHANGE UP

## 2022-10-07 ENCOUNTER — APPOINTMENT (OUTPATIENT)
Dept: ORTHOPEDIC SURGERY | Facility: CLINIC | Age: 62
End: 2022-10-07

## 2022-10-07 DIAGNOSIS — M25.562 PAIN IN RIGHT KNEE: ICD-10-CM

## 2022-10-07 DIAGNOSIS — G89.29 PAIN IN RIGHT KNEE: ICD-10-CM

## 2022-10-07 DIAGNOSIS — M16.12 UNILATERAL PRIMARY OSTEOARTHRITIS, LEFT HIP: ICD-10-CM

## 2022-10-07 DIAGNOSIS — M25.561 PAIN IN RIGHT KNEE: ICD-10-CM

## 2022-10-07 PROCEDURE — 99024 POSTOP FOLLOW-UP VISIT: CPT

## 2022-10-07 RX ORDER — CEPHALEXIN 500 MG/1
500 TABLET ORAL EVERY 8 HOURS
Qty: 21 | Refills: 0 | Status: ACTIVE | COMMUNITY
Start: 2022-10-07 | End: 1900-01-01

## 2022-10-07 NOTE — DISCUSSION/SUMMARY
[de-identified] : The patient is doing well after joint replacement surgery. Has wound irritation at the most proximal aspect. Apply bacitracin daily. Try keflex tid x 1 week prophylactically. Discussed stitch irritation. Written infectious precautions were reviewed. Continue with HEP. Return around the 3 weeks for wound check.

## 2022-10-07 NOTE — PHYSICAL EXAM
[de-identified] : Well developed, well nourished in no apparent distress, awake, alert and orientated to person, place and time with appropriate mood and affect\par Respirations are even and unlabored. Gait evaluation does not reveal a limp. There is no inguinal adenopathy. The affected limb is well-perfused with palpable pedal pulse, without skin lesions, shows a grossly normal motor and sensory examination. Incision is healed except the most proximal area of the wound has skin irritation. I cleaned the area with betadine and attempted to decompress this with an 18-gauge needle. Bacitrain applied. Hip motion is full and painless throughout ROM. Leg lengths are approximately equal

## 2022-10-07 NOTE — REASON FOR VISIT
[Post-Operative Visit] : a post-operative visit for [Artificial Hip Joint] : an artificial hip joint

## 2022-10-07 NOTE — HISTORY OF PRESENT ILLNESS
[de-identified] : Status-post left total hip  arthroplasty here for routine postoperative evaluation. Excellent progress is noted in terms of pain and restoration of function. Pain is well controlled with oral medications. Was at [facility] recently for new afib. Has b/l LE swelling but DVT r/o and CTA r/o PE several times. Unclear reason for this but being w/u by medicine, cards, and pulm. The patient does not require assistive devices.

## 2022-10-25 ENCOUNTER — APPOINTMENT (OUTPATIENT)
Dept: ORTHOPEDIC SURGERY | Facility: CLINIC | Age: 62
End: 2022-10-25

## 2022-10-25 VITALS — WEIGHT: 154 LBS | HEIGHT: 61 IN | BODY MASS INDEX: 29.07 KG/M2

## 2022-10-25 PROCEDURE — 99024 POSTOP FOLLOW-UP VISIT: CPT

## 2022-10-25 NOTE — HISTORY OF PRESENT ILLNESS
[de-identified] : Status-post left total hip  arthroplasty here for routine postoperative evaluation. Excellent progress is noted in terms of pain and restoration of function. Pain is well controlled with oral medications.Here for wound check. The patient does not require assistive devices.

## 2022-10-25 NOTE — PHYSICAL EXAM
[de-identified] : Well developed, well nourished in no apparent distress, awake, alert and orientated to person, place and time with appropriate mood and affect\par Respirations are even and unlabored. Gait evaluation does not reveal a limp. There is no inguinal adenopathy. The affected limb is well-perfused with palpable pedal pulse, without skin lesions, shows a grossly normal motor and sensory examination. Incision is now healed.  No fluctuance. Hip motion is full and painless throughout ROM. Leg lengths are approximately equal

## 2022-12-02 ENCOUNTER — APPOINTMENT (OUTPATIENT)
Dept: ORTHOPEDIC SURGERY | Facility: CLINIC | Age: 62
End: 2022-12-02

## 2022-12-02 VITALS
TEMPERATURE: 97.7 F | BODY MASS INDEX: 29.07 KG/M2 | HEIGHT: 61 IN | DIASTOLIC BLOOD PRESSURE: 86 MMHG | SYSTOLIC BLOOD PRESSURE: 145 MMHG | OXYGEN SATURATION: 97 % | WEIGHT: 154 LBS | HEART RATE: 88 BPM

## 2022-12-02 DIAGNOSIS — M79.18 MYALGIA, OTHER SITE: ICD-10-CM

## 2022-12-02 PROCEDURE — 99214 OFFICE O/P EST MOD 30 MIN: CPT

## 2022-12-02 PROCEDURE — 73523 X-RAY EXAM HIPS BI 5/> VIEWS: CPT

## 2022-12-03 ENCOUNTER — APPOINTMENT (OUTPATIENT)
Dept: MRI IMAGING | Facility: HOSPITAL | Age: 62
End: 2022-12-03

## 2022-12-03 ENCOUNTER — OUTPATIENT (OUTPATIENT)
Dept: OUTPATIENT SERVICES | Facility: HOSPITAL | Age: 62
LOS: 1 days | End: 2022-12-03
Payer: MEDICARE

## 2022-12-03 DIAGNOSIS — S39.012A STRAIN OF MUSCLE, FASCIA AND TENDON OF LOWER BACK, INITIAL ENCOUNTER: ICD-10-CM
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
## 2022-12-03 DIAGNOSIS — M54.16 RADICULOPATHY, LUMBAR REGION: ICD-10-CM

## 2022-12-03 DIAGNOSIS — M48.061 SPINAL STENOSIS, LUMBAR REGION WITHOUT NEUROGENIC CLAUDICATION: ICD-10-CM

## 2022-12-03 DIAGNOSIS — M51.16 INTERVERTEBRAL DISC DISORDERS WITH RADICULOPATHY, LUMBAR REGION: ICD-10-CM

## 2022-12-03 DIAGNOSIS — M51.17 INTERVERTEBRAL DISC DISORDERS WITH RADICULOPATHY, LUMBOSACRAL REGION: ICD-10-CM

## 2022-12-03 PROCEDURE — 72148 MRI LUMBAR SPINE W/O DYE: CPT | Mod: 26,MH

## 2022-12-03 PROCEDURE — 72148 MRI LUMBAR SPINE W/O DYE: CPT

## 2022-12-03 NOTE — DISCUSSION/SUMMARY
[de-identified] : Well-functioning bilateral total hip arthroplasties.  The pain appears to be extra-articular and likely related to either lumbar radiculopathy or trochanteric bursitis.  The patient is not an appropriate candidate for surgical intervention at this time. An extensive discussion was conducted on the natural history of the disease and the variety of surgical and non-surgical options available to the patient including, but not limited to non-steroidal anti-inflammatory medications, steroid injections, physical therapy, maintenance of ideal body weight, and reduction of activity.  The patient will be sent for a MRI of the bilateral hips and lumbar spine. They will notify me when the MRI is complete and we will arrange for either an in person or telehealth virtual visit to review the results as well as any next steps in the plan.\par

## 2022-12-03 NOTE — PHYSICAL EXAM
[de-identified] : Patient is well nourished, well-developed, in no acute distress, with appropriate mood and affect. The patient is oriented to time, place, and person. Respirations are even and unlabored. Gait evaluation does not reveal a limp. There is no inguinal adenopathy.  The right limb is well-perfused and showed 2+ dp/pt pulses, without skin lesions, shows a grossly normal motor and sensory examination. Examination of the hip shows a well-healed surgical scar. Hip motion is full and painless from 0-90 degrees extension to flexion, 20 degrees adduction and 20 degrees abduction, and 15 degrees internal and 30 degrees external rotation. FADIR is negative and DEANGELO is negative. Stinchfield test is negative. The left limb is well-perfused and showed 2+ dp/pt pulses, without skin lesions, shows a grossly normal motor and sensory examination. Examination of the hip shows a well-healed surgical scar. Hip motion is full and painless from 0-90 degrees extension to flexion, 20 degrees adduction and 20 degrees abduction, and 15 degrees internal and 30 degrees external rotation. FADIR is negative and DEANGELO is negative. Stinchfield test is negative.  Leg lengths are approximately equal. Both hips are stable and muscle strength is normal with good strength with resisted abduction and adduction. Pedal pulses are palpable.  Tender to palpation over the lateral aspect of the bilateral hips and the bilateral buttocks. [de-identified] : AP pelvis, AP hip, and lateral x-rays of the bilateral hip were ordered and obtained in the office and demonstrate satisfactory position and alignment of the components are present. No signs of loosening are seen.

## 2022-12-03 NOTE — HISTORY OF PRESENT ILLNESS
[de-identified] : This is very nice 62-year-old female experiencing bilateral buttock pain, which is severe in intensity. The pain substantially limits activities of daily living. Walking tolerance is reduced.  Initially did very well from bilateral total hip arthroplasties.  This pain started over the last month.  The patient denies any radiation of the pain to the feet and it is not associated with numbness, tingling, or weakness.

## 2022-12-05 ENCOUNTER — APPOINTMENT (OUTPATIENT)
Dept: ORTHOPEDIC SURGERY | Facility: CLINIC | Age: 62
End: 2022-12-05

## 2022-12-15 NOTE — PHYSICAL THERAPY INITIAL EVALUATION ADULT - ASR WT BEARING STATUS EVAL
Patient: Halie Hargrove    Procedure Summary     Date: 12/15/22 Room / Location:  [facility] /  [facility]    Anesthesia Start: 1222 Anesthesia Stop: 1300    Procedure: COLONOSCOPY to cecum with biopsies / polypectomies Diagnosis:       Personal history of colonic polyps      (Personal history of colonic polyps [Z86.010])    Surgeons: Mark Olson MD Provider: Jsutin Fishman MD    Anesthesia Type: MAC ASA Status: 2          Anesthesia Type: MAC    Vitals  Vitals Value Taken Time   /78 12/15/22 1320   Temp     Pulse 83 12/15/22 1320   Resp 16 12/15/22 1320   SpO2 95 % 12/15/22 1320           Post Anesthesia Care and Evaluation    Level of consciousness: awake  Pain management: satisfactory to patient    Airway patency: patent  Anesthetic complications: No anesthetic complications  PONV Status: controlled  Cardiovascular status: acceptable  Respiratory status: acceptable  Hydration status: acceptable       Left LE

## 2022-12-16 ENCOUNTER — APPOINTMENT (OUTPATIENT)
Dept: ORTHOPEDIC SURGERY | Facility: CLINIC | Age: 62
End: 2022-12-16

## 2022-12-16 VITALS — HEIGHT: 61 IN | WEIGHT: 154 LBS | BODY MASS INDEX: 29.07 KG/M2

## 2022-12-16 DIAGNOSIS — Z96.641 PRESENCE OF RIGHT ARTIFICIAL HIP JOINT: ICD-10-CM

## 2022-12-16 DIAGNOSIS — Z96.642 PRESENCE OF LEFT ARTIFICIAL HIP JOINT: ICD-10-CM

## 2022-12-16 DIAGNOSIS — M54.16 RADICULOPATHY, LUMBAR REGION: ICD-10-CM

## 2022-12-16 DIAGNOSIS — M79.659 PAIN IN UNSPECIFIED THIGH: ICD-10-CM

## 2022-12-16 PROCEDURE — 99215 OFFICE O/P EST HI 40 MIN: CPT

## 2022-12-16 NOTE — HISTORY OF PRESENT ILLNESS
[de-identified] : This is very nice 62-year-old female experiencing bilateral buttock pain, which is severe in intensity. The pain substantially limits activities of daily living. Walking tolerance is reduced.  Initially did very well from bilateral total hip arthroplasties.  This pain started over the last month.  The patient denies any radiation of the pain to the feet and it is not associated with numbness, tingling, or weakness.

## 2022-12-16 NOTE — PHYSICAL EXAM
[de-identified] : Patient is well nourished, well-developed, in no acute distress, with appropriate mood and affect. The patient is oriented to time, place, and person. Respirations are even and unlabored. Gait evaluation does not reveal a limp. There is no inguinal adenopathy.  The right limb is well-perfused and showed 2+ dp/pt pulses, without skin lesions, shows a grossly normal motor and sensory examination. Examination of the hip shows a well-healed surgical scar. Hip motion is full and painless from 0-90 degrees extension to flexion, 20 degrees adduction and 20 degrees abduction, and 15 degrees internal and 30 degrees external rotation. FADIR is negative and DEANGELO is negative. Stinchfield test is negative. The left limb is well-perfused and showed 2+ dp/pt pulses, without skin lesions, shows a grossly normal motor and sensory examination. Examination of the hip shows a well-healed surgical scar. Hip motion is full and painless from 0-90 degrees extension to flexion, 20 degrees adduction and 20 degrees abduction, and 15 degrees internal and 30 degrees external rotation. FADIR is negative and DEANGELO is negative. Stinchfield test is negative.  Leg lengths are approximately equal. Both hips are stable and muscle strength is normal with good strength with resisted abduction and adduction. Pedal pulses are palpable.  Tender to palpation over the lateral aspect of the bilateral hips and the bilateral buttocks. [de-identified] : AP pelvis, AP hip, and lateral x-rays of the bilateral hip were reviewed from the previous visit and demonstrate satisfactory position and alignment of the components are present. No signs of loosening are seen.\par \par The patient brings with her an MRI of the left hip reviewed the MR imaging with the patient 7 straits a strain of the vastus lateralis.  The MRI reads osteolysis but I do not see any evidence of osteolysis on the image.\par \par Patient brings with her an MRI of the right hip.  This demonstrates a strain of the right sartorius.  The MRI read shows osteolysis but I do not see any evidence of osteolysis on the image.\par \par The patient brings her an MRI of the lumbar spine.  Reviewed the MRI imaging with the patient was demonstrates L1 to and L3-5 degenerative disc disease and lumbar stenosis.

## 2022-12-16 NOTE — DISCUSSION/SUMMARY
[de-identified] : Well-functioning bilateral total hip arthroplasties.  The pain appears to be extra-articular and likely related to either lumbar radiculopathy.  Imaging confirmed lumbar stenosis.  Symptoms are consistent with this.  She also has right sartorius muscle strain left vastus lateralis strain.  The patient is not an appropriate candidate for surgical intervention at this time. An extensive discussion was conducted on the natural history of the disease and the variety of surgical and non-surgical options available to the patient including, but not limited to non-steroidal anti-inflammatory medications, steroid injections, physical therapy, maintenance of ideal body weight, and reduction of activity.  Several questions sought and answered by the patient.  Physical therapy prescribed.  Mobic prescribed.  Recommended following up with physiatry and also will consider pain management and spine surgery consult.

## 2023-03-01 NOTE — H&P ADULT - NSHPLANGLIMITEDENGLISH_GEN_A_CORE
History of Present Illness: Justus Hamilton is a 36 y.o. female who presents with symptoms of depression, anxiety and grief, ADHD     Duration of session: 59 min     Mental Status exam:           Sensorium  oriented to time, place and person   Relations cooperative   Appearance:  age appropriate   Motor Behavior:  restless and within normal limits   Speech:  normal pitch and normal volume   Thought Process: goal directed   Thought Content free of delusions, free of hallucinations and not internally preoccupied    Suicidal ideations none   Homicidal ideations none   Mood:  anxious and stable   Affect:  anxious, full range, stable and mood-congruent   Memory recent  adequate   Memory remote:  adequate   Concentration:  impaired   Abstraction:  abstract   Insight:  good   Reliability good   Judgment:  good            DIAGNOSIS AND IMPRESSION:        Axis I: mdd, adhd, grief, BETTE  Axis II: No diagnosis  Axis III: History reviewed. No pertinent past medical history. Axis IV: Problems with primary support group, Occupational problems and Other psychosocial or environmental problems  Axis V:  61-70 mild symptoms        Strengths: humor, smart, hard working  Trauma: mom mdd, poor functioning when cl in childhood     Client presents via doxy vv for follow up, stable and improved space. Reports some progress in work tasks, using body doubling strategy. Reports this past weekend coming off progesterone triggered intense \"PMS symptoms\". Cl working through what next steps should be. Follow up next week. Justus Hamilton (: 1988) is a 36 y.o. female, established patient, here for evaluation of the following chief complaint(s):   No chief complaint on file. ASSESSMENT/PLAN:  Below is the assessment and plan developed based on review of pertinent history, labs, studies, and medications. through a synchronous (real-time) audio-video encounter.  The patient (or guardian if applicable) is aware that this is a billable service, which includes applicable co-pays. This Virtual Visit was conducted with patient's (and/or legal guardian's) consent. The visit was conducted pursuant to the emergency declaration under the [facility], [address] [place] [address] authority and the [employer] and [place] [employer] Act. Patient identification was verified, and a caregiver was present when appropriate. The patient was located at: Home: [address] [address] [facility] [address]  The provider was located at: Home: [address] was used to authenticate this note.   -- Francisco J Schmid LCSW No

## 2023-03-25 ENCOUNTER — RX RENEWAL (OUTPATIENT)
Age: 63
End: 2023-03-25

## 2023-03-25 RX ORDER — MELOXICAM 15 MG/1
15 TABLET ORAL
Qty: 30 | Refills: 2 | Status: ACTIVE | COMMUNITY
Start: 2022-12-16 | End: 1900-01-01

## 2023-07-09 NOTE — H&P PST ADULT - HEALTH CARE MAINTENANCE
[employer] x2 completed, pt aware covid swab is required 3-5 days prior to surgery
3 = A little assistance

## 2023-07-11 ENCOUNTER — APPOINTMENT (OUTPATIENT)
Dept: ORTHOPEDIC SURGERY | Facility: CLINIC | Age: 63
End: 2023-07-11
Payer: MEDICARE

## 2023-07-11 VITALS — WEIGHT: 150 LBS | HEIGHT: 61 IN | BODY MASS INDEX: 28.32 KG/M2

## 2023-07-11 DIAGNOSIS — Z96.643 PRESENCE OF ARTIFICIAL HIP JOINT, BILATERAL: ICD-10-CM

## 2023-07-11 PROCEDURE — 73523 X-RAY EXAM HIPS BI 5/> VIEWS: CPT

## 2023-07-11 PROCEDURE — 99214 OFFICE O/P EST MOD 30 MIN: CPT

## 2023-07-11 NOTE — PHYSICAL EXAM
[de-identified] : Patient is well nourished, well-developed, in no acute distress, with appropriate mood and affect. The patient is oriented to time, place, and person. Respirations are even and unlabored. Gait evaluation does not reveal a limp.  The bilateral limbs are well-perfused and showed 2+ dp/pt pulses, without skin lesions, shows a grossly normal motor and sensory examination. Examination of the hip shows a well healed surgical scar. Hip motion is full and painless from 0-90 degrees extension to flexion, 20 degrees adduction and 20 degrees abduction, and 15 degrees internal and 30 degrees external rotation. Leg lengths are approximately equal. Both hips are stable and muscle strength is normal with good strength with resisted abduction and adduction. Pedal pulses are palpable. [de-identified] : AP pelvis, AP hip, and lateral x-rays of the bilateral hip were ordered and obtained in the office and demonstrate satisfactory position and alignment of the components are present. No signs of loosening are seen.

## 2023-07-11 NOTE — HISTORY OF PRESENT ILLNESS
[de-identified] : This is very nice 63-year-old female here for interim evaluation of bilateral total hip arthroplasty. The patient reports good pain relief since surgery in the replaced joint and satisfactory restoration of function in terms of activities of daily living. The patient no longer requires an assistive device for ambulation, does not require pain medication, and completed postoperative physical therapy. They report unlimited activities of daily living and unlimited walking tolerance. The patient is thrilled with their progress from surgery and ultimate outcome. \par

## 2023-07-11 NOTE — DISCUSSION/SUMMARY
[de-identified] : The patient is doing well after bilateral total hip arthroplasty. Continue to be weight bearing as tolerated without restriction. Daily home exercise program recommended. Follow up is recommended every 3 to 5 years for long-term monitoring.

## 2023-11-20 NOTE — PATIENT PROFILE ADULT - FUNCTIONAL ASSESSMENT - BASIC MOBILITY 2.
Surgery Date: 12/7/23    Location of Surgery: [facility]    Surgery type: excision of right breast mass with localization    Localization type: RF    Laterality:right side           Number of sites:1    Location: 7:00    Ransom node needed:No    MRI: na         4 = No assist / stand by assistance

## 2024-07-08 ENCOUNTER — NON-APPOINTMENT (OUTPATIENT)
Age: 64
End: 2024-07-08

## 2024-07-09 ENCOUNTER — APPOINTMENT (OUTPATIENT)
Dept: ORTHOPEDIC SURGERY | Facility: CLINIC | Age: 64
End: 2024-07-09
Payer: MEDICARE

## 2024-07-09 VITALS — BODY MASS INDEX: 28.32 KG/M2 | WEIGHT: 150 LBS | HEIGHT: 61 IN

## 2024-07-09 DIAGNOSIS — G89.29 PAIN IN LEFT SHOULDER: ICD-10-CM

## 2024-07-09 DIAGNOSIS — M25.512 PAIN IN LEFT SHOULDER: ICD-10-CM

## 2024-07-09 PROCEDURE — 99213 OFFICE O/P EST LOW 20 MIN: CPT

## 2024-07-24 NOTE — H&P PST ADULT - REASON FOR ADMISSION
Price (Do Not Change): 0.00
Detail Level: Generalized
right hip replacement
Instructions: This plan will send the code FBSD to the PM system.  DO NOT or CHANGE the price.

## 2025-01-28 ENCOUNTER — APPOINTMENT (OUTPATIENT)
Dept: ORTHOPEDIC SURGERY | Facility: CLINIC | Age: 65
End: 2025-01-28
Payer: MEDICARE

## 2025-01-28 VITALS — HEIGHT: 61 IN | WEIGHT: 150 LBS | BODY MASS INDEX: 28.32 KG/M2

## 2025-01-28 DIAGNOSIS — G89.29 PAIN IN LEFT SHOULDER: ICD-10-CM

## 2025-01-28 DIAGNOSIS — M25.512 PAIN IN LEFT SHOULDER: ICD-10-CM

## 2025-01-28 PROCEDURE — 99214 OFFICE O/P EST MOD 30 MIN: CPT | Mod: 25

## 2025-01-28 PROCEDURE — 20610 DRAIN/INJ JOINT/BURSA W/O US: CPT | Mod: LT

## 2025-02-04 ENCOUNTER — APPOINTMENT (OUTPATIENT)
Dept: ORTHOPEDIC SURGERY | Facility: CLINIC | Age: 65
End: 2025-02-04

## 2025-02-18 ENCOUNTER — APPOINTMENT (OUTPATIENT)
Dept: ORTHOPEDIC SURGERY | Facility: CLINIC | Age: 65
End: 2025-02-18
Payer: MEDICARE

## 2025-02-18 VITALS — HEIGHT: 61 IN | BODY MASS INDEX: 28.32 KG/M2 | WEIGHT: 150 LBS

## 2025-02-18 DIAGNOSIS — Z96.643 PRESENCE OF ARTIFICIAL HIP JOINT, BILATERAL: ICD-10-CM

## 2025-02-18 DIAGNOSIS — M48.061 SPINAL STENOSIS, LUMBAR REGION WITHOUT NEUROGENIC CLAUDICATION: ICD-10-CM

## 2025-02-18 PROCEDURE — G2211 COMPLEX E/M VISIT ADD ON: CPT

## 2025-02-18 PROCEDURE — 73523 X-RAY EXAM HIPS BI 5/> VIEWS: CPT

## 2025-02-18 PROCEDURE — 99214 OFFICE O/P EST MOD 30 MIN: CPT

## 2025-02-19 ENCOUNTER — NON-APPOINTMENT (OUTPATIENT)
Age: 65
End: 2025-02-19

## 2025-03-10 ENCOUNTER — APPOINTMENT (OUTPATIENT)
Dept: ORTHOPEDIC SURGERY | Facility: CLINIC | Age: 65
End: 2025-03-10
Payer: MEDICARE

## 2025-03-10 DIAGNOSIS — M54.16 RADICULOPATHY, LUMBAR REGION: ICD-10-CM

## 2025-03-10 DIAGNOSIS — Z96.643 PRESENCE OF ARTIFICIAL HIP JOINT, BILATERAL: ICD-10-CM

## 2025-03-10 DIAGNOSIS — M48.061 SPINAL STENOSIS, LUMBAR REGION WITHOUT NEUROGENIC CLAUDICATION: ICD-10-CM

## 2025-03-10 PROCEDURE — 99213 OFFICE O/P EST LOW 20 MIN: CPT | Mod: 93

## 2025-03-11 DIAGNOSIS — I89.0 LYMPHEDEMA, NOT ELSEWHERE CLASSIFIED: ICD-10-CM

## 2025-03-20 ENCOUNTER — APPOINTMENT (OUTPATIENT)
Dept: RHEUMATOLOGY | Facility: CLINIC | Age: 65
End: 2025-03-20
Payer: MEDICARE

## 2025-03-20 ENCOUNTER — LABORATORY RESULT (OUTPATIENT)
Age: 65
End: 2025-03-20

## 2025-03-20 VITALS
TEMPERATURE: 97.6 F | SYSTOLIC BLOOD PRESSURE: 122 MMHG | OXYGEN SATURATION: 97 % | HEART RATE: 87 BPM | DIASTOLIC BLOOD PRESSURE: 68 MMHG

## 2025-03-20 VITALS — TEMPERATURE: 97.1 F | WEIGHT: 150 LBS | BODY MASS INDEX: 28.32 KG/M2 | HEIGHT: 61 IN

## 2025-03-20 DIAGNOSIS — R21 RASH AND OTHER NONSPECIFIC SKIN ERUPTION: ICD-10-CM

## 2025-03-20 DIAGNOSIS — R76.8 OTHER SPECIFIED ABNORMAL IMMUNOLOGICAL FINDINGS IN SERUM: ICD-10-CM

## 2025-03-20 PROBLEM — M25.50 MULTIPLE JOINT PAIN: Status: ACTIVE | Noted: 2025-03-20

## 2025-03-20 PROCEDURE — G2211 COMPLEX E/M VISIT ADD ON: CPT

## 2025-03-20 PROCEDURE — 99205 OFFICE O/P NEW HI 60 MIN: CPT

## 2025-03-20 RX ORDER — METRONIDAZOLE 7.5 MG/G
0.75 GEL TOPICAL
Refills: 0 | Status: ACTIVE | COMMUNITY

## 2025-03-20 RX ORDER — ATORVASTATIN CALCIUM 10 MG/1
10 TABLET, FILM COATED ORAL
Refills: 0 | Status: ACTIVE | COMMUNITY

## 2025-03-20 RX ORDER — LEVOTHYROXINE SODIUM 0.05 MG/1
50 TABLET ORAL
Refills: 0 | Status: ACTIVE | COMMUNITY

## 2025-03-20 RX ORDER — ASPIRIN 81 MG
81 TABLET, DELAYED RELEASE (ENTERIC COATED) ORAL
Refills: 0 | Status: ACTIVE | COMMUNITY

## 2025-03-25 DIAGNOSIS — M25.50 PAIN IN UNSPECIFIED JOINT: ICD-10-CM

## 2025-03-25 LAB
24R-OH-CALCIDIOL SERPL-MCNC: 49.1 PG/ML
25(OH)D3 SERPL-MCNC: 29.5 NG/ML
ACE BLD-CCNC: 34 U/L
ALBUMIN SERPL ELPH-MCNC: 4.2 G/DL
ALP BLD-CCNC: 103 U/L
ALT SERPL-CCNC: 19 U/L
ANCA AB SER-IMP: ABNORMAL
ANION GAP SERPL CALC-SCNC: 10 MMOL/L
APPEARANCE: CLEAR
AST SERPL-CCNC: 19 U/L
BASOPHILS # BLD AUTO: 0.04 K/UL
BASOPHILS NFR BLD AUTO: 0.6 %
BILIRUB SERPL-MCNC: 0.4 MG/DL
BILIRUBIN URINE: NEGATIVE
BLOOD URINE: NEGATIVE
BUN SERPL-MCNC: 12 MG/DL
C-ANCA SER-ACNC: NEGATIVE
C3 SERPL-MCNC: 160 MG/DL
C4 SERPL-MCNC: 48 MG/DL
CALCIUM SERPL-MCNC: 9.2 MG/DL
CCP AB SER IA-ACNC: <8 U/ML
CENTROMERE IGG SER-ACNC: <0.2 AL
CHLORIDE SERPL-SCNC: 101 MMOL/L
CO2 SERPL-SCNC: 26 MMOL/L
COLOR: YELLOW
CREAT SERPL-MCNC: 0.63 MG/DL
CRP SERPL-MCNC: <3 MG/L
DSDNA AB SER-ACNC: <1 IU/ML
EGFRCR SERPLBLD CKD-EPI 2021: 99 ML/MIN/1.73M2
ENA RNP AB SER IA-ACNC: 0.2 AL
ENA SCL70 IGG SER IA-ACNC: <0.2 AL
ENA SM AB SER IA-ACNC: <0.2 AL
ENA SS-A AB SER IA-ACNC: <0.2 AL
ENA SS-B AB SER IA-ACNC: <0.2 AL
EOSINOPHIL # BLD AUTO: 0.07 K/UL
EOSINOPHIL NFR BLD AUTO: 1 %
ERYTHROCYTE [SEDIMENTATION RATE] IN BLOOD BY WESTERGREN METHOD: 42 MM/HR
G6PD SER-CCNC: 17.1 U/G HGB
GLUCOSE QUALITATIVE U: NEGATIVE MG/DL
GLUCOSE SERPL-MCNC: 73 MG/DL
HCT VFR BLD CALC: 39.8 %
HGB BLD-MCNC: 12.9 G/DL
IMM GRANULOCYTES NFR BLD AUTO: 0.1 %
KETONES URINE: NEGATIVE MG/DL
LEUKOCYTE ESTERASE URINE: NEGATIVE
LUPUS ANTICOAGULANT CASCADE REFLEX: NORMAL
LYMPHOCYTES # BLD AUTO: 2.06 K/UL
LYMPHOCYTES NFR BLD AUTO: 29.8 %
MAN DIFF?: NORMAL
MCHC RBC-ENTMCNC: 29.1 PG
MCHC RBC-ENTMCNC: 32.4 G/DL
MCV RBC AUTO: 89.8 FL
MONOCYTES # BLD AUTO: 0.51 K/UL
MONOCYTES NFR BLD AUTO: 7.4 %
NEUTROPHILS # BLD AUTO: 4.23 K/UL
NEUTROPHILS NFR BLD AUTO: 61.1 %
NITRITE URINE: NEGATIVE
P-ANCA TITR SER IF: NEGATIVE
PH URINE: 7
PLATELET # BLD AUTO: 260 K/UL
POTASSIUM SERPL-SCNC: 4.1 MMOL/L
PROT SERPL-MCNC: 6.7 G/DL
PROTEIN URINE: NORMAL MG/DL
RBC # BLD: 4.43 M/UL
RBC # FLD: 13.8 %
RF+CCP IGG SER-IMP: NEGATIVE
SODIUM SERPL-SCNC: 137 MMOL/L
SPECIFIC GRAVITY URINE: 1.02
UROBILINOGEN URINE: 0.2 MG/DL
WBC # FLD AUTO: 6.92 K/UL

## 2025-03-26 ENCOUNTER — LABORATORY RESULT (OUTPATIENT)
Age: 65
End: 2025-03-26

## 2025-03-31 ENCOUNTER — APPOINTMENT (OUTPATIENT)
Dept: RHEUMATOLOGY | Facility: CLINIC | Age: 65
End: 2025-03-31
Payer: MEDICARE

## 2025-03-31 DIAGNOSIS — R70.0 ELEVATED ERYTHROCYTE SEDIMENTATION RATE: ICD-10-CM

## 2025-03-31 DIAGNOSIS — R60.0 LOCALIZED EDEMA: ICD-10-CM

## 2025-03-31 PROCEDURE — G2211 COMPLEX E/M VISIT ADD ON: CPT | Mod: 93

## 2025-03-31 PROCEDURE — 99214 OFFICE O/P EST MOD 30 MIN: CPT | Mod: 93

## 2025-04-01 PROBLEM — R70.0 ELEVATED SED RATE: Status: ACTIVE | Noted: 2025-04-01

## 2025-04-01 LAB — ERYTHROCYTE [SEDIMENTATION RATE] IN BLOOD BY WESTERGREN METHOD: 47 MM/HR

## 2025-04-02 LAB
ALBUMIN MFR SERPL ELPH: 58.6 %
ALBUMIN SERPL-MCNC: 3.9 G/DL
ALBUMIN/GLOB SERPL: 1.4 RATIO
ALPHA1 GLOB MFR SERPL ELPH: 4.3 %
ALPHA1 GLOB SERPL ELPH-MCNC: 0.3 G/DL
ALPHA2 GLOB MFR SERPL ELPH: 9.6 %
ALPHA2 GLOB SERPL ELPH-MCNC: 0.6 G/DL
B-GLOBULIN MFR SERPL ELPH: 13.5 %
B-GLOBULIN SERPL ELPH-MCNC: 0.9 G/DL
GAMMA GLOB FLD ELPH-MCNC: 0.9 G/DL
GAMMA GLOB MFR SERPL ELPH: 14 %
INTERPRETATION SERPL IEP-IMP: NORMAL
PROT SERPL-MCNC: 6.6 G/DL
PROT SERPL-MCNC: 6.6 G/DL

## 2025-04-17 PROBLEM — Z83.49 FAMILY HISTORY OF HYPOTHYROIDISM: Status: ACTIVE | Noted: 2025-04-17

## 2025-05-06 ENCOUNTER — NON-APPOINTMENT (OUTPATIENT)
Age: 65
End: 2025-05-06

## 2025-05-07 ENCOUNTER — APPOINTMENT (OUTPATIENT)
Dept: OBGYN | Facility: CLINIC | Age: 65
End: 2025-05-07
Payer: MEDICARE

## 2025-05-07 VITALS
HEIGHT: 61 IN | OXYGEN SATURATION: 97 % | HEART RATE: 79 BPM | BODY MASS INDEX: 28.32 KG/M2 | TEMPERATURE: 97.2 F | DIASTOLIC BLOOD PRESSURE: 60 MMHG | WEIGHT: 150 LBS | SYSTOLIC BLOOD PRESSURE: 124 MMHG

## 2025-05-07 DIAGNOSIS — Z11.51 ENCOUNTER FOR SCREENING FOR HUMAN PAPILLOMAVIRUS (HPV): ICD-10-CM

## 2025-05-07 DIAGNOSIS — R19.00 INTRA-ABDOMINAL AND PELVIC SWELLING, MASS AND LUMP, UNSPECIFIED SITE: ICD-10-CM

## 2025-05-07 DIAGNOSIS — Z01.419 ENCOUNTER FOR GYNECOLOGICAL EXAMINATION (GENERAL) (ROUTINE) W/OUT ABNORMAL FINDINGS: ICD-10-CM

## 2025-05-07 DIAGNOSIS — N76.0 ACUTE VAGINITIS: ICD-10-CM

## 2025-05-07 PROCEDURE — 99386 PREV VISIT NEW AGE 40-64: CPT | Mod: GY

## 2025-05-07 PROCEDURE — G0101: CPT

## 2025-05-07 RX ORDER — CHLORHEXIDINE GLUCONATE 4 %
LIQUID (ML) TOPICAL
Refills: 0 | Status: ACTIVE | COMMUNITY

## 2025-05-07 RX ORDER — SAW PALMETTO 160 MG
500 CAPSULE ORAL
Refills: 0 | Status: ACTIVE | COMMUNITY

## 2025-05-07 RX ORDER — UBIDECARENONE/VIT E ACET 100MG-5
25 MCG CAPSULE ORAL
Refills: 0 | Status: ACTIVE | COMMUNITY

## 2025-05-09 LAB
C TRACH RRNA SPEC QL NAA+PROBE: NOT DETECTED
CANDIDA VAG CYTO: NOT DETECTED
G VAGINALIS+PREV SP MTYP VAG QL MICRO: NOT DETECTED
HPV HIGH+LOW RISK DNA PNL CVX: NOT DETECTED
N GONORRHOEA RRNA SPEC QL NAA+PROBE: NOT DETECTED
SOURCE AMPLIFICATION: NORMAL
T VAGINALIS VAG QL WET PREP: NOT DETECTED

## 2025-05-12 LAB — CYTOLOGY CVX/VAG DOC THIN PREP: ABNORMAL

## 2025-05-16 ENCOUNTER — APPOINTMENT (OUTPATIENT)
Dept: ULTRASOUND IMAGING | Facility: CLINIC | Age: 65
End: 2025-05-16

## 2025-05-16 PROCEDURE — 76882 US LMTD JT/FCL EVL NVASC XTR: CPT | Mod: LT

## 2025-05-19 DIAGNOSIS — R19.00 INTRA-ABDOMINAL AND PELVIC SWELLING, MASS AND LUMP, UNSPECIFIED SITE: ICD-10-CM

## 2025-05-29 ENCOUNTER — NON-APPOINTMENT (OUTPATIENT)
Age: 65
End: 2025-05-29

## 2025-05-30 ENCOUNTER — APPOINTMENT (OUTPATIENT)
Dept: CARDIOLOGY | Facility: CLINIC | Age: 65
End: 2025-05-30
Payer: MEDICARE

## 2025-05-30 VITALS — SYSTOLIC BLOOD PRESSURE: 132 MMHG | HEART RATE: 69 BPM | OXYGEN SATURATION: 96 % | DIASTOLIC BLOOD PRESSURE: 82 MMHG

## 2025-05-30 DIAGNOSIS — R60.0 LOCALIZED EDEMA: ICD-10-CM

## 2025-05-30 PROCEDURE — 99204 OFFICE O/P NEW MOD 45 MIN: CPT

## 2025-06-04 ENCOUNTER — APPOINTMENT (OUTPATIENT)
Dept: MRI IMAGING | Facility: CLINIC | Age: 65
End: 2025-06-04

## 2025-06-04 ENCOUNTER — APPOINTMENT (OUTPATIENT)
Dept: ULTRASOUND IMAGING | Facility: CLINIC | Age: 65
End: 2025-06-04
Payer: MEDICARE

## 2025-06-04 ENCOUNTER — TRANSCRIPTION ENCOUNTER (OUTPATIENT)
Age: 65
End: 2025-06-04

## 2025-06-04 DIAGNOSIS — Z00.00 ENCOUNTER FOR GENERAL ADULT MEDICAL EXAMINATION W/OUT ABNORMAL FINDINGS: ICD-10-CM

## 2025-06-04 PROBLEM — Z86.718 HISTORY OF DVT (DEEP VEIN THROMBOSIS): Status: ACTIVE | Noted: 2025-06-04

## 2025-06-04 PROCEDURE — 74185 MRA ABD W OR W/O CNTRST: CPT

## 2025-06-04 PROCEDURE — 72198 MR ANGIO PELVIS W/O & W/DYE: CPT

## 2025-06-04 PROCEDURE — 93970 EXTREMITY STUDY: CPT

## 2025-06-04 PROCEDURE — A9585: CPT | Mod: JW

## 2025-06-04 RX ORDER — APIXABAN 5 MG/1
5 TABLET, FILM COATED ORAL
Qty: 180 | Refills: 3 | Status: ACTIVE | COMMUNITY
Start: 2025-06-04 | End: 1900-01-01

## 2025-06-06 LAB
ALBUMIN SERPL ELPH-MCNC: 4.1 G/DL
ALP BLD-CCNC: 107 U/L
ALT SERPL-CCNC: 26 U/L
ANION GAP SERPL CALC-SCNC: 14 MMOL/L
AST SERPL-CCNC: 22 U/L
BILIRUB SERPL-MCNC: 0.4 MG/DL
BUN SERPL-MCNC: 13 MG/DL
CALCIUM SERPL-MCNC: 9.8 MG/DL
CHLORIDE SERPL-SCNC: 105 MMOL/L
CO2 SERPL-SCNC: 26 MMOL/L
CREAT SERPL-MCNC: 0.73 MG/DL
DEPRECATED D DIMER PPP IA-ACNC: 293 NG/ML DDU
EGFRCR SERPLBLD CKD-EPI 2021: 92 ML/MIN/1.73M2
GLUCOSE SERPL-MCNC: 115 MG/DL
HCT VFR BLD CALC: 40.5 %
HGB BLD-MCNC: 12.7 G/DL
MCHC RBC-ENTMCNC: 28.7 PG
MCHC RBC-ENTMCNC: 31.4 G/DL
MCV RBC AUTO: 91.4 FL
PLATELET # BLD AUTO: 266 K/UL
POTASSIUM SERPL-SCNC: 4.5 MMOL/L
PROT SERPL-MCNC: 6.5 G/DL
RBC # BLD: 4.43 M/UL
RBC # FLD: 13.4 %
SODIUM SERPL-SCNC: 144 MMOL/L
WBC # FLD AUTO: 6.55 K/UL

## 2025-06-17 ENCOUNTER — APPOINTMENT (OUTPATIENT)
Dept: ORTHOPEDIC SURGERY | Facility: CLINIC | Age: 65
End: 2025-06-17
Payer: MEDICARE

## 2025-06-17 VITALS — BODY MASS INDEX: 28.32 KG/M2 | HEIGHT: 61 IN | WEIGHT: 150 LBS

## 2025-06-17 PROCEDURE — 73522 X-RAY EXAM HIPS BI 3-4 VIEWS: CPT

## 2025-06-17 PROCEDURE — 99204 OFFICE O/P NEW MOD 45 MIN: CPT

## 2025-06-18 ENCOUNTER — LABORATORY RESULT (OUTPATIENT)
Age: 65
End: 2025-06-18

## 2025-06-18 ENCOUNTER — RESULT REVIEW (OUTPATIENT)
Age: 65
End: 2025-06-18

## 2025-06-30 ENCOUNTER — APPOINTMENT (OUTPATIENT)
Dept: CARDIOLOGY | Facility: CLINIC | Age: 65
End: 2025-06-30
Payer: MEDICARE

## 2025-06-30 VITALS
DIASTOLIC BLOOD PRESSURE: 81 MMHG | SYSTOLIC BLOOD PRESSURE: 144 MMHG | WEIGHT: 150 LBS | HEART RATE: 82 BPM | BODY MASS INDEX: 28.34 KG/M2 | OXYGEN SATURATION: 95 %

## 2025-06-30 PROCEDURE — 99214 OFFICE O/P EST MOD 30 MIN: CPT

## 2025-06-30 PROCEDURE — G2211 COMPLEX E/M VISIT ADD ON: CPT

## 2025-07-01 ENCOUNTER — APPOINTMENT (OUTPATIENT)
Facility: CLINIC | Age: 65
End: 2025-07-01
Payer: MEDICARE

## 2025-07-01 ENCOUNTER — APPOINTMENT (OUTPATIENT)
Dept: ORTHOPEDIC SURGERY | Facility: CLINIC | Age: 65
End: 2025-07-01

## 2025-07-01 VITALS
BODY MASS INDEX: 28.32 KG/M2 | DIASTOLIC BLOOD PRESSURE: 75 MMHG | HEART RATE: 71 BPM | WEIGHT: 150 LBS | SYSTOLIC BLOOD PRESSURE: 121 MMHG | RESPIRATION RATE: 15 BRPM | OXYGEN SATURATION: 98 % | HEIGHT: 61 IN

## 2025-07-01 PROBLEM — Z80.42 FAMILY HISTORY OF MALIGNANT NEOPLASM OF PROSTATE: Status: ACTIVE | Noted: 2025-07-01

## 2025-07-01 PROBLEM — K82.8 GALLBLADDER SLUDGE: Status: ACTIVE | Noted: 2025-07-01

## 2025-07-01 PROBLEM — Z80.8 FAMILY HISTORY OF MALIGNANT NEOPLASM OF SKIN: Status: ACTIVE | Noted: 2025-07-01

## 2025-07-01 PROBLEM — Z12.11 COLON CANCER SCREENING: Status: ACTIVE | Noted: 2025-07-01

## 2025-07-01 PROBLEM — Q45.3 PANCREAS DIVISUM: Status: ACTIVE | Noted: 2025-07-01

## 2025-07-01 PROBLEM — Z96.642 HISTORY OF TOTAL LEFT HIP REPLACEMENT: Status: ACTIVE | Noted: 2025-06-17

## 2025-07-01 PROBLEM — Z78.9 CONSUMES ALCOHOL OCCASIONALLY: Status: ACTIVE | Noted: 2025-07-01

## 2025-07-01 PROBLEM — Z96.641 HISTORY OF ARTHROPLASTY OF RIGHT HIP: Status: ACTIVE | Noted: 2025-06-17

## 2025-07-01 PROBLEM — M76.11 PSOAS TENDINITIS OF BOTH SIDES: Status: ACTIVE | Noted: 2025-07-01

## 2025-07-01 PROCEDURE — 99204 OFFICE O/P NEW MOD 45 MIN: CPT

## 2025-07-01 PROCEDURE — 99214 OFFICE O/P EST MOD 30 MIN: CPT

## 2025-07-02 ENCOUNTER — APPOINTMENT (OUTPATIENT)
Dept: ORTHOPEDIC SURGERY | Facility: CLINIC | Age: 65
End: 2025-07-02
Payer: MEDICARE

## 2025-07-02 PROCEDURE — 99214 OFFICE O/P EST MOD 30 MIN: CPT

## 2025-07-03 NOTE — ED CDU PROVIDER SUBSEQUENT DAY NOTE - ATTENDING APP SHARED VISIT CONTRIBUTION OF CARE
"Notify patient (or her daughter Rosa Isela) that it looks like other lab orders still need to be drawn in order to try to explain her anemia, etc.  \"Future\" lab orders are in the chart by specialist.  Please have her complete these tests as directed by her specialist so that we can have this information available as well in order to \"determine why her protein drinks are not helping her labs\".  " I personally have seen and examined this patient.  Physician assistant note reviewed and agree on plan of care and except where noted. Patient re-evaluated and doing well.  No acute issues at  this time.  Lab and radiology tests reviewed with patient and/or family.  ortho recommendations with plan for admitting for longer course of iv abx to medicine, cleared otherwise with recent hip surgery, vss, labs wnl, site improving continue iv abx.

## 2025-07-24 ENCOUNTER — APPOINTMENT (OUTPATIENT)
Dept: ORTHOPEDIC SURGERY | Facility: CLINIC | Age: 65
End: 2025-07-24
Payer: MEDICARE

## 2025-07-24 VITALS — HEIGHT: 61 IN | WEIGHT: 150 LBS | BODY MASS INDEX: 28.32 KG/M2

## 2025-07-24 DIAGNOSIS — M76.12 PSOAS TENDINITIS, LEFT HIP: ICD-10-CM

## 2025-07-24 DIAGNOSIS — M70.71 OTHER BURSITIS OF HIP, RIGHT HIP: ICD-10-CM

## 2025-07-24 DIAGNOSIS — M70.72 OTHER BURSITIS OF HIP, LEFT HIP: ICD-10-CM

## 2025-07-24 DIAGNOSIS — M76.11 PSOAS TENDINITIS, RIGHT HIP: ICD-10-CM

## 2025-07-24 PROCEDURE — 99215 OFFICE O/P EST HI 40 MIN: CPT

## 2025-08-01 ENCOUNTER — APPOINTMENT (OUTPATIENT)
Dept: ORTHOPEDIC SURGERY | Facility: CLINIC | Age: 65
End: 2025-08-01
Payer: MEDICARE

## 2025-08-01 VITALS
WEIGHT: 151 LBS | DIASTOLIC BLOOD PRESSURE: 79 MMHG | HEIGHT: 61 IN | SYSTOLIC BLOOD PRESSURE: 115 MMHG | BODY MASS INDEX: 28.51 KG/M2

## 2025-08-01 DIAGNOSIS — Z96.643 PRESENCE OF ARTIFICIAL HIP JOINT, BILATERAL: ICD-10-CM

## 2025-08-01 PROCEDURE — G2211 COMPLEX E/M VISIT ADD ON: CPT

## 2025-08-01 PROCEDURE — 99214 OFFICE O/P EST MOD 30 MIN: CPT

## 2025-08-15 ENCOUNTER — APPOINTMENT (OUTPATIENT)
Dept: ORTHOPEDIC SURGERY | Facility: CLINIC | Age: 65
End: 2025-08-15
Payer: MEDICARE

## 2025-08-15 DIAGNOSIS — M25.512 PAIN IN LEFT SHOULDER: ICD-10-CM

## 2025-08-15 DIAGNOSIS — G89.29 PAIN IN LEFT SHOULDER: ICD-10-CM

## 2025-08-15 PROCEDURE — 99213 OFFICE O/P EST LOW 20 MIN: CPT

## 2025-08-19 ENCOUNTER — APPOINTMENT (OUTPATIENT)
Dept: ULTRASOUND IMAGING | Facility: CLINIC | Age: 65
End: 2025-08-19
Payer: MEDICARE

## 2025-08-19 PROCEDURE — 93970 EXTREMITY STUDY: CPT

## 2025-08-26 ENCOUNTER — APPOINTMENT (OUTPATIENT)
Dept: ORTHOPEDIC SURGERY | Facility: CLINIC | Age: 65
End: 2025-08-26

## 2025-08-26 ENCOUNTER — APPOINTMENT (OUTPATIENT)
Dept: ORTHOPEDIC SURGERY | Facility: CLINIC | Age: 65
End: 2025-08-26
Payer: MEDICARE

## 2025-08-26 VITALS — BODY MASS INDEX: 27.38 KG/M2 | WEIGHT: 145 LBS | HEIGHT: 61 IN

## 2025-08-26 PROCEDURE — 99214 OFFICE O/P EST MOD 30 MIN: CPT

## 2025-09-02 ENCOUNTER — APPOINTMENT (OUTPATIENT)
Dept: ORTHOPEDIC SURGERY | Facility: CLINIC | Age: 65
End: 2025-09-02
Payer: MEDICARE

## 2025-09-02 VITALS — WEIGHT: 145 LBS | HEIGHT: 61 IN | BODY MASS INDEX: 27.38 KG/M2

## 2025-09-02 DIAGNOSIS — M70.71 OTHER BURSITIS OF HIP, RIGHT HIP: ICD-10-CM

## 2025-09-02 DIAGNOSIS — G89.29 PAIN IN LEFT SHOULDER: ICD-10-CM

## 2025-09-02 DIAGNOSIS — M25.512 PAIN IN LEFT SHOULDER: ICD-10-CM

## 2025-09-02 DIAGNOSIS — M70.72 OTHER BURSITIS OF HIP, LEFT HIP: ICD-10-CM

## 2025-09-02 PROCEDURE — 99213 OFFICE O/P EST LOW 20 MIN: CPT

## 2025-09-09 ENCOUNTER — APPOINTMENT (OUTPATIENT)
Dept: ORTHOPEDIC SURGERY | Facility: CLINIC | Age: 65
End: 2025-09-09

## 2025-09-10 ENCOUNTER — APPOINTMENT (OUTPATIENT)
Dept: CARDIOLOGY | Facility: CLINIC | Age: 65
End: 2025-09-10
Payer: MEDICARE

## 2025-09-10 VITALS
HEIGHT: 61 IN | BODY MASS INDEX: 27.38 KG/M2 | OXYGEN SATURATION: 99 % | SYSTOLIC BLOOD PRESSURE: 122 MMHG | DIASTOLIC BLOOD PRESSURE: 76 MMHG | HEART RATE: 69 BPM | WEIGHT: 145 LBS

## 2025-09-10 DIAGNOSIS — Z86.718 PERSONAL HISTORY OF OTHER VENOUS THROMBOSIS AND EMBOLISM: ICD-10-CM

## 2025-09-10 PROCEDURE — 99214 OFFICE O/P EST MOD 30 MIN: CPT

## 2025-09-10 PROCEDURE — G2211 COMPLEX E/M VISIT ADD ON: CPT

## 2025-09-10 RX ORDER — APIXABAN 5 MG/1
5 TABLET, FILM COATED ORAL
Qty: 90 | Refills: 3 | Status: ACTIVE | COMMUNITY
Start: 2025-09-10

## 2025-09-15 ENCOUNTER — NON-APPOINTMENT (OUTPATIENT)
Age: 65
End: 2025-09-15

## 2025-09-18 ENCOUNTER — NON-APPOINTMENT (OUTPATIENT)
Age: 65
End: 2025-09-18

## (undated) DEVICE — DRSG AQUACEL 3.5 X 10"

## (undated) DEVICE — BASIN SET SINGLE

## (undated) DEVICE — SYR LUER LOK 20CC

## (undated) DEVICE — PACK BASIC

## (undated) DEVICE — PREP CHLORAPREP HI-LITE ORANGE 26ML

## (undated) DEVICE — TUBING TUR 2 PRONG

## (undated) DEVICE — DRAPE SPLIT SHEET 77" X 120"

## (undated) DEVICE — DRAPE INSTRUMENT POUCH 6.75" X 11"

## (undated) DEVICE — WARMING BLANKET UPPER ADULT

## (undated) DEVICE — DRAPE IRRIGATION POUCH 19X23"

## (undated) DEVICE — DRSG DERMABOND PRINEO 22CM

## (undated) DEVICE — SUT QUILL PDO 2 36CM 40MM

## (undated) DEVICE — NDL HYPO SAFE 18G X 1.5" (PINK)

## (undated) DEVICE — DRSG COBAN 6"

## (undated) DEVICE — ZIMMER PULSAVAC PLUS FAN KIT

## (undated) DEVICE — SUT MONOCRYL 2-0 27" SH UNDYED

## (undated) DEVICE — PACK TOTAL JOINT

## (undated) DEVICE — DRAPE STICKY U BLUE 60 X 84"

## (undated) DEVICE — DRSG CURITY GAUZE SPONGE 4 X 4" 12-PLY

## (undated) DEVICE — GOWN SMARTGOWN XL/XLONG

## (undated) DEVICE — VENODYNE/SCD SLEEVE CALF MEDIUM

## (undated) DEVICE — LIGHT PIPE

## (undated) DEVICE — GOWN SURGEON VEST

## (undated) DEVICE — GLV 8.5 PROTEXIS (BLUE)

## (undated) DEVICE — SOL IRR POUR NS 0.9% 1000ML

## (undated) DEVICE — FRA-ESU BOVIE CODMAN HP3467AU: Type: DURABLE MEDICAL EQUIPMENT

## (undated) DEVICE — SUT VICRYL 1 27" CPX UNDYED

## (undated) DEVICE — DRAPE 3/4 SHEET W REINFORCEMENT 56X77"

## (undated) DEVICE — FRAZIER SUCTION TIP 18FR

## (undated) DEVICE — DRAPE TOWEL BLUE 17" X 24"

## (undated) DEVICE — MARKING PEN W RULER

## (undated) DEVICE — NDL HYPO SAFE 22G X 1.5" (BLACK)

## (undated) DEVICE — BLADE SURGICAL #20 CARBON

## (undated) DEVICE — GLV 8.5 PROTEXIS (WHITE)

## (undated) DEVICE — SUT MONOCRYL 3-0 27" PS-2 UNDYED

## (undated) DEVICE — DRAPE TOWEL 1010

## (undated) DEVICE — FRA-ESU BOVIE FORCE TRIAD T7J19745DX: Type: DURABLE MEDICAL EQUIPMENT

## (undated) DEVICE — STRYKER ACM MIXING BOWL 180GM W CARTRIDGE

## (undated) DEVICE — DRAPE BAR ORTHOMAX

## (undated) DEVICE — STRYKER BREAKAWAY FEMORAL NOZZLE 200MM

## (undated) DEVICE — SAW BLADE STRYKER DUAL CUT 1.27X11 X90MM

## (undated) DEVICE — DRAPE U 47X51" LF STERILE